# Patient Record
Sex: FEMALE | Race: WHITE | Employment: OTHER | URBAN - METROPOLITAN AREA
[De-identification: names, ages, dates, MRNs, and addresses within clinical notes are randomized per-mention and may not be internally consistent; named-entity substitution may affect disease eponyms.]

---

## 2017-02-28 RX ORDER — ROPINIROLE 0.5 MG/1
0.5 TABLET, FILM COATED ORAL 3 TIMES DAILY
Qty: 270 TABLET | Refills: 0 | Status: SHIPPED | OUTPATIENT
Start: 2017-02-28 | End: 2017-03-03 | Stop reason: SDUPTHER

## 2017-03-01 ENCOUNTER — HOSPITAL ENCOUNTER (OUTPATIENT)
Dept: OTHER | Age: 82
Discharge: HOME | End: 2017-03-31
Attending: INTERNAL MEDICINE | Admitting: INTERNAL MEDICINE

## 2017-03-03 ENCOUNTER — OFFICE VISIT (OUTPATIENT)
Dept: INTERNAL MEDICINE CLINIC | Age: 82
End: 2017-03-03

## 2017-03-03 VITALS
RESPIRATION RATE: 12 BRPM | SYSTOLIC BLOOD PRESSURE: 142 MMHG | OXYGEN SATURATION: 94 % | BODY MASS INDEX: 35.08 KG/M2 | HEIGHT: 63 IN | WEIGHT: 198 LBS | HEART RATE: 56 BPM | DIASTOLIC BLOOD PRESSURE: 76 MMHG

## 2017-03-03 DIAGNOSIS — E03.9 ACQUIRED HYPOTHYROIDISM: ICD-10-CM

## 2017-03-03 DIAGNOSIS — K21.9 GASTROESOPHAGEAL REFLUX DISEASE, ESOPHAGITIS PRESENCE NOT SPECIFIED: ICD-10-CM

## 2017-03-03 DIAGNOSIS — R73.01 IMPAIRED FASTING GLUCOSE: ICD-10-CM

## 2017-03-03 DIAGNOSIS — Z12.31 ENCOUNTER FOR SCREENING MAMMOGRAM FOR MALIGNANT NEOPLASM OF BREAST: ICD-10-CM

## 2017-03-03 DIAGNOSIS — E78.5 HYPERLIPIDEMIA, UNSPECIFIED HYPERLIPIDEMIA TYPE: ICD-10-CM

## 2017-03-03 DIAGNOSIS — N18.30 CKD (CHRONIC KIDNEY DISEASE), STAGE III (HCC): ICD-10-CM

## 2017-03-03 DIAGNOSIS — E66.09 NON MORBID OBESITY DUE TO EXCESS CALORIES: ICD-10-CM

## 2017-03-03 DIAGNOSIS — I10 ESSENTIAL HYPERTENSION: Primary | ICD-10-CM

## 2017-03-03 DIAGNOSIS — G25.81 RESTLESS LEG SYNDROME: ICD-10-CM

## 2017-03-03 DIAGNOSIS — Z12.11 COLON CANCER SCREENING: ICD-10-CM

## 2017-03-03 LAB — HBA1C MFR BLD: 5.7 %

## 2017-03-03 PROCEDURE — G8427 DOCREV CUR MEDS BY ELIG CLIN: HCPCS | Performed by: INTERNAL MEDICINE

## 2017-03-03 PROCEDURE — 1123F ACP DISCUSS/DSCN MKR DOCD: CPT | Performed by: INTERNAL MEDICINE

## 2017-03-03 PROCEDURE — 83036 HEMOGLOBIN GLYCOSYLATED A1C: CPT | Performed by: INTERNAL MEDICINE

## 2017-03-03 PROCEDURE — 82274 ASSAY TEST FOR BLOOD FECAL: CPT | Performed by: INTERNAL MEDICINE

## 2017-03-03 PROCEDURE — 4040F PNEUMOC VAC/ADMIN/RCVD: CPT | Performed by: INTERNAL MEDICINE

## 2017-03-03 PROCEDURE — G8417 CALC BMI ABV UP PARAM F/U: HCPCS | Performed by: INTERNAL MEDICINE

## 2017-03-03 PROCEDURE — G8484 FLU IMMUNIZE NO ADMIN: HCPCS | Performed by: INTERNAL MEDICINE

## 2017-03-03 PROCEDURE — 99203 OFFICE O/P NEW LOW 30 MIN: CPT | Performed by: INTERNAL MEDICINE

## 2017-03-03 PROCEDURE — 1036F TOBACCO NON-USER: CPT | Performed by: INTERNAL MEDICINE

## 2017-03-03 PROCEDURE — 1090F PRES/ABSN URINE INCON ASSESS: CPT | Performed by: INTERNAL MEDICINE

## 2017-03-03 PROCEDURE — G8400 PT W/DXA NO RESULTS DOC: HCPCS | Performed by: INTERNAL MEDICINE

## 2017-03-03 RX ORDER — FLUVASTATIN SODIUM 80 MG/1
80 TABLET, FILM COATED, EXTENDED RELEASE ORAL DAILY
Qty: 90 TABLET | Refills: 3 | Status: SHIPPED | OUTPATIENT
Start: 2017-03-03 | End: 2018-02-06 | Stop reason: SDUPTHER

## 2017-03-03 RX ORDER — CHLORAL HYDRATE 500 MG
1000 CAPSULE ORAL 2 TIMES DAILY
Qty: 180 CAPSULE | Refills: 3 | Status: SHIPPED | OUTPATIENT
Start: 2017-03-03 | End: 2018-09-12 | Stop reason: ALTCHOICE

## 2017-03-03 RX ORDER — LOSARTAN POTASSIUM 50 MG/1
TABLET ORAL
Qty: 90 TABLET | Refills: 3 | Status: SHIPPED | OUTPATIENT
Start: 2017-03-03 | End: 2017-04-18 | Stop reason: SDUPTHER

## 2017-03-03 RX ORDER — PANTOPRAZOLE SODIUM 40 MG/1
40 TABLET, DELAYED RELEASE ORAL DAILY
Qty: 90 TABLET | Refills: 3 | Status: SHIPPED | OUTPATIENT
Start: 2017-03-03 | End: 2017-07-06

## 2017-03-03 RX ORDER — ROPINIROLE 0.5 MG/1
0.5 TABLET, FILM COATED ORAL 3 TIMES DAILY
Qty: 270 TABLET | Refills: 3 | Status: SHIPPED | OUTPATIENT
Start: 2017-03-03 | End: 2017-08-03 | Stop reason: DRUGHIGH

## 2017-03-03 RX ORDER — LEVOTHYROXINE SODIUM 137 UG/1
150 TABLET ORAL DAILY
Qty: 90 TABLET | Refills: 1 | Status: SHIPPED | OUTPATIENT
Start: 2017-03-03 | End: 2017-03-10 | Stop reason: SDUPTHER

## 2017-03-03 RX ORDER — RANITIDINE 150 MG/1
150 TABLET ORAL 2 TIMES DAILY
Qty: 90 TABLET | Refills: 3 | Status: SHIPPED | OUTPATIENT
Start: 2017-03-03 | End: 2017-08-07 | Stop reason: SDUPTHER

## 2017-03-05 PROBLEM — E78.5 HYPERLIPIDEMIA: Status: ACTIVE | Noted: 2017-03-05

## 2017-03-05 PROBLEM — I10 ESSENTIAL HYPERTENSION: Status: ACTIVE | Noted: 2017-03-05

## 2017-03-05 PROBLEM — G25.81 RESTLESS LEG SYNDROME: Status: ACTIVE | Noted: 2017-03-05

## 2017-03-05 PROBLEM — R73.01 IMPAIRED FASTING GLUCOSE: Status: ACTIVE | Noted: 2017-03-05

## 2017-03-05 PROBLEM — E03.9 ACQUIRED HYPOTHYROIDISM: Status: ACTIVE | Noted: 2017-03-05

## 2017-03-05 PROBLEM — N18.30 CKD (CHRONIC KIDNEY DISEASE), STAGE III (HCC): Status: ACTIVE | Noted: 2017-03-05

## 2017-03-05 PROBLEM — K21.9 GASTROESOPHAGEAL REFLUX DISEASE: Status: ACTIVE | Noted: 2017-03-05

## 2017-03-09 LAB
ALBUMIN SERPL-MCNC: 3.9 GM/DL (ref 3.4–5)
ALP BLD-CCNC: 77 IU/L (ref 40–128)
ALT SERPL-CCNC: 9 U/L (ref 10–40)
ANION GAP SERPL CALCULATED.3IONS-SCNC: 9 MMOL/L (ref 4–16)
AST SERPL-CCNC: 14 IU/L (ref 15–37)
BASOPHILS ABSOLUTE: 0 K/CU MM
BASOPHILS RELATIVE PERCENT: 0.7 % (ref 0–1)
BILIRUB SERPL-MCNC: 0.3 MG/DL (ref 0–1)
BUN BLDV-MCNC: 19 MG/DL (ref 6–23)
CALCIUM SERPL-MCNC: 9.9 MG/DL (ref 8.3–10.6)
CHLORIDE BLD-SCNC: 106 MMOL/L (ref 99–110)
CHOLESTEROL: 166 MG/DL
CO2: 29 MMOL/L (ref 21–32)
CREAT SERPL-MCNC: 1.1 MG/DL (ref 0.6–1.1)
DIFFERENTIAL TYPE: ABNORMAL
EOSINOPHILS ABSOLUTE: 0.2 K/CU MM
EOSINOPHILS RELATIVE PERCENT: 3.5 % (ref 0–3)
GFR AFRICAN AMERICAN: 58 ML/MIN/1.73M2
GFR NON-AFRICAN AMERICAN: 48 ML/MIN/1.73M2
GLUCOSE BLD-MCNC: 95 MG/DL (ref 70–140)
HCT VFR BLD CALC: 38.5 % (ref 37–47)
HDLC SERPL-MCNC: 47 MG/DL
HEMOGLOBIN: 12.2 GM/DL (ref 12.5–16)
IMMATURE NEUTROPHIL %: 0.3 % (ref 0–0.43)
LDL CHOLESTEROL DIRECT: 115 MG/DL
LYMPHOCYTES ABSOLUTE: 2.6 K/CU MM
LYMPHOCYTES RELATIVE PERCENT: 44.6 % (ref 24–44)
MCH RBC QN AUTO: 26.7 PG (ref 27–31)
MCHC RBC AUTO-ENTMCNC: 31.7 % (ref 32–36)
MCV RBC AUTO: 84.2 FL (ref 78–100)
MONOCYTES ABSOLUTE: 0.5 K/CU MM
MONOCYTES RELATIVE PERCENT: 8.4 % (ref 0–4)
NUCLEATED RBC %: 0 %
PDW BLD-RTO: 13.5 % (ref 11.7–14.9)
PLATELET # BLD: 180 K/CU MM (ref 140–440)
PMV BLD AUTO: 11.9 FL (ref 7.5–11.1)
POTASSIUM SERPL-SCNC: 4.2 MMOL/L (ref 3.5–5.1)
RBC # BLD: 4.57 M/CU MM (ref 4.2–5.4)
SEGMENTED NEUTROPHILS ABSOLUTE COUNT: 2.4 K/CU MM
SEGMENTED NEUTROPHILS RELATIVE PERCENT: 42.5 % (ref 36–66)
SODIUM BLD-SCNC: 144 MMOL/L (ref 135–145)
T4 FREE: 1.78 NG/DL (ref 0.9–1.8)
TOTAL IMMATURE NEUTOROPHIL: 0.02 K/CU MM
TOTAL NUCLEATED RBC: 0 K/CU MM
TOTAL PROTEIN: 6 GM/DL (ref 6.4–8.2)
TRIGL SERPL-MCNC: 128 MG/DL
TSH HIGH SENSITIVITY: 0.15 UIU/ML (ref 0.27–4.2)
WBC # BLD: 5.7 K/CU MM (ref 4–10.5)

## 2017-03-10 ENCOUNTER — TELEPHONE (OUTPATIENT)
Dept: INTERNAL MEDICINE CLINIC | Age: 82
End: 2017-03-10

## 2017-03-10 DIAGNOSIS — E03.9 ACQUIRED HYPOTHYROIDISM: ICD-10-CM

## 2017-03-10 LAB
CONTROL: PRESENT
HEMOCCULT STL QL: NORMAL

## 2017-03-10 RX ORDER — LEVOTHYROXINE SODIUM 0.12 MG/1
125 TABLET ORAL DAILY
Qty: 90 TABLET | Refills: 1 | Status: SHIPPED | OUTPATIENT
Start: 2017-03-10 | End: 2017-08-07 | Stop reason: SDUPTHER

## 2017-03-20 ENCOUNTER — HOSPITAL ENCOUNTER (OUTPATIENT)
Dept: WOMENS IMAGING | Age: 82
Discharge: HOME | End: 2017-03-20
Attending: INTERNAL MEDICINE | Admitting: INTERNAL MEDICINE

## 2017-03-20 DIAGNOSIS — Z12.31 ENCOUNTER FOR SCREENING MAMMOGRAM FOR MALIGNANT NEOPLASM OF BREAST: ICD-10-CM

## 2017-03-23 ENCOUNTER — TELEPHONE (OUTPATIENT)
Dept: INTERNAL MEDICINE CLINIC | Age: 82
End: 2017-03-23

## 2017-03-23 DIAGNOSIS — R92.8 ABNORMAL MAMMOGRAM OF RIGHT BREAST: Primary | ICD-10-CM

## 2017-03-29 ENCOUNTER — HOSPITAL ENCOUNTER (OUTPATIENT)
Dept: ULTRASOUND IMAGING | Age: 82
Discharge: HOME | End: 2017-03-29
Attending: INTERNAL MEDICINE | Admitting: INTERNAL MEDICINE

## 2017-03-29 DIAGNOSIS — N64.89 BREAST ASYMMETRY: ICD-10-CM

## 2017-03-29 DIAGNOSIS — R92.8 ABNORMAL MAMMOGRAM: ICD-10-CM

## 2017-04-01 ENCOUNTER — HOSPITAL ENCOUNTER (OUTPATIENT)
Dept: OTHER | Age: 82
Discharge: HOME | End: 2017-04-30
Attending: INTERNAL MEDICINE | Admitting: INTERNAL MEDICINE

## 2017-04-04 ENCOUNTER — OFFICE VISIT (OUTPATIENT)
Dept: INTERNAL MEDICINE CLINIC | Age: 82
End: 2017-04-04

## 2017-04-04 VITALS
BODY MASS INDEX: 35.08 KG/M2 | DIASTOLIC BLOOD PRESSURE: 100 MMHG | SYSTOLIC BLOOD PRESSURE: 178 MMHG | OXYGEN SATURATION: 99 % | HEIGHT: 63 IN | RESPIRATION RATE: 12 BRPM | HEART RATE: 64 BPM | WEIGHT: 198 LBS

## 2017-04-04 DIAGNOSIS — R73.01 IMPAIRED FASTING GLUCOSE: ICD-10-CM

## 2017-04-04 DIAGNOSIS — E78.5 HYPERLIPIDEMIA, UNSPECIFIED HYPERLIPIDEMIA TYPE: ICD-10-CM

## 2017-04-04 DIAGNOSIS — K21.9 GASTROESOPHAGEAL REFLUX DISEASE, ESOPHAGITIS PRESENCE NOT SPECIFIED: ICD-10-CM

## 2017-04-04 DIAGNOSIS — I10 ESSENTIAL HYPERTENSION: Primary | ICD-10-CM

## 2017-04-04 DIAGNOSIS — E03.9 ACQUIRED HYPOTHYROIDISM: ICD-10-CM

## 2017-04-04 DIAGNOSIS — E66.09 NON MORBID OBESITY DUE TO EXCESS CALORIES: ICD-10-CM

## 2017-04-04 PROCEDURE — G8427 DOCREV CUR MEDS BY ELIG CLIN: HCPCS | Performed by: INTERNAL MEDICINE

## 2017-04-04 PROCEDURE — 1123F ACP DISCUSS/DSCN MKR DOCD: CPT | Performed by: INTERNAL MEDICINE

## 2017-04-04 PROCEDURE — 4040F PNEUMOC VAC/ADMIN/RCVD: CPT | Performed by: INTERNAL MEDICINE

## 2017-04-04 PROCEDURE — 1036F TOBACCO NON-USER: CPT | Performed by: INTERNAL MEDICINE

## 2017-04-04 PROCEDURE — G8417 CALC BMI ABV UP PARAM F/U: HCPCS | Performed by: INTERNAL MEDICINE

## 2017-04-04 PROCEDURE — 1090F PRES/ABSN URINE INCON ASSESS: CPT | Performed by: INTERNAL MEDICINE

## 2017-04-04 PROCEDURE — 99214 OFFICE O/P EST MOD 30 MIN: CPT | Performed by: INTERNAL MEDICINE

## 2017-04-04 PROCEDURE — G8400 PT W/DXA NO RESULTS DOC: HCPCS | Performed by: INTERNAL MEDICINE

## 2017-04-04 RX ORDER — AMLODIPINE BESYLATE 5 MG/1
5 TABLET ORAL DAILY
Qty: 30 TABLET | Refills: 3 | Status: SHIPPED | OUTPATIENT
Start: 2017-04-04 | End: 2017-08-03 | Stop reason: SDUPTHER

## 2017-04-04 RX ORDER — AMLODIPINE BESYLATE 5 MG/1
5 TABLET ORAL DAILY
Qty: 30 TABLET | Refills: 3 | Status: SHIPPED | OUTPATIENT
Start: 2017-04-04 | End: 2017-04-04 | Stop reason: SDUPTHER

## 2017-04-18 ENCOUNTER — OFFICE VISIT (OUTPATIENT)
Dept: INTERNAL MEDICINE CLINIC | Age: 82
End: 2017-04-18

## 2017-04-18 VITALS
WEIGHT: 194 LBS | RESPIRATION RATE: 12 BRPM | HEIGHT: 63 IN | BODY MASS INDEX: 34.38 KG/M2 | DIASTOLIC BLOOD PRESSURE: 79 MMHG | SYSTOLIC BLOOD PRESSURE: 158 MMHG | HEART RATE: 52 BPM | OXYGEN SATURATION: 98 %

## 2017-04-18 DIAGNOSIS — N18.30 CKD (CHRONIC KIDNEY DISEASE), STAGE III (HCC): ICD-10-CM

## 2017-04-18 DIAGNOSIS — I10 ESSENTIAL HYPERTENSION: Primary | ICD-10-CM

## 2017-04-18 PROCEDURE — 99213 OFFICE O/P EST LOW 20 MIN: CPT | Performed by: INTERNAL MEDICINE

## 2017-04-18 PROCEDURE — G8400 PT W/DXA NO RESULTS DOC: HCPCS | Performed by: INTERNAL MEDICINE

## 2017-04-18 PROCEDURE — G8427 DOCREV CUR MEDS BY ELIG CLIN: HCPCS | Performed by: INTERNAL MEDICINE

## 2017-04-18 PROCEDURE — 1123F ACP DISCUSS/DSCN MKR DOCD: CPT | Performed by: INTERNAL MEDICINE

## 2017-04-18 PROCEDURE — 1036F TOBACCO NON-USER: CPT | Performed by: INTERNAL MEDICINE

## 2017-04-18 PROCEDURE — G8417 CALC BMI ABV UP PARAM F/U: HCPCS | Performed by: INTERNAL MEDICINE

## 2017-04-18 PROCEDURE — 1090F PRES/ABSN URINE INCON ASSESS: CPT | Performed by: INTERNAL MEDICINE

## 2017-04-18 PROCEDURE — 4040F PNEUMOC VAC/ADMIN/RCVD: CPT | Performed by: INTERNAL MEDICINE

## 2017-04-18 RX ORDER — FUROSEMIDE 20 MG/1
20 TABLET ORAL EVERY OTHER DAY
Qty: 60 TABLET | Refills: 3 | Status: SHIPPED | OUTPATIENT
Start: 2017-04-18 | End: 2017-08-31 | Stop reason: SDUPTHER

## 2017-04-18 RX ORDER — LOSARTAN POTASSIUM 50 MG/1
50 TABLET ORAL 2 TIMES DAILY
Qty: 180 TABLET | Refills: 1 | Status: SHIPPED | OUTPATIENT
Start: 2017-04-18 | End: 2017-07-06

## 2017-04-18 ASSESSMENT — PATIENT HEALTH QUESTIONNAIRE - PHQ9
SUM OF ALL RESPONSES TO PHQ QUESTIONS 1-9: 0
2. FEELING DOWN, DEPRESSED OR HOPELESS: 0
SUM OF ALL RESPONSES TO PHQ9 QUESTIONS 1 & 2: 0
1. LITTLE INTEREST OR PLEASURE IN DOING THINGS: 0

## 2017-04-20 LAB
ANION GAP SERPL CALCULATED.3IONS-SCNC: 14 MMOL/L (ref 4–16)
BUN BLDV-MCNC: 16 MG/DL (ref 6–23)
CALCIUM SERPL-MCNC: 9.8 MG/DL (ref 8.3–10.6)
CHLORIDE BLD-SCNC: 108 MMOL/L (ref 99–110)
CO2: 23 MMOL/L (ref 21–32)
CREAT SERPL-MCNC: 1.2 MG/DL (ref 0.6–1.1)
GFR AFRICAN AMERICAN: 52 ML/MIN/1.73M2
GFR NON-AFRICAN AMERICAN: 43 ML/MIN/1.73M2
GLUCOSE BLD-MCNC: 92 MG/DL (ref 70–140)
MAGNESIUM: 2.3 MG/DL (ref 1.8–2.4)
POTASSIUM SERPL-SCNC: 4.3 MMOL/L (ref 3.5–5.1)
SODIUM BLD-SCNC: 145 MMOL/L (ref 135–145)

## 2017-04-22 ENCOUNTER — TELEPHONE (OUTPATIENT)
Dept: INTERNAL MEDICINE CLINIC | Age: 82
End: 2017-04-22

## 2017-04-22 DIAGNOSIS — N18.30 CKD (CHRONIC KIDNEY DISEASE), STAGE III (HCC): Primary | ICD-10-CM

## 2017-04-28 LAB
T4 FREE: 1.28 NG/DL (ref 0.9–1.8)
TSH HIGH SENSITIVITY: 1.8 UIU/ML (ref 0.27–4.2)

## 2017-05-01 ENCOUNTER — HOSPITAL ENCOUNTER (OUTPATIENT)
Dept: OTHER | Age: 82
Discharge: HOME | End: 2017-05-31
Attending: INTERNAL MEDICINE | Admitting: INTERNAL MEDICINE

## 2017-05-02 ENCOUNTER — OFFICE VISIT (OUTPATIENT)
Dept: INTERNAL MEDICINE CLINIC | Age: 82
End: 2017-05-02

## 2017-05-02 VITALS
HEIGHT: 63 IN | WEIGHT: 198 LBS | OXYGEN SATURATION: 95 % | HEART RATE: 63 BPM | SYSTOLIC BLOOD PRESSURE: 116 MMHG | DIASTOLIC BLOOD PRESSURE: 70 MMHG | BODY MASS INDEX: 35.08 KG/M2 | RESPIRATION RATE: 12 BRPM

## 2017-05-02 DIAGNOSIS — I10 ESSENTIAL HYPERTENSION: Primary | ICD-10-CM

## 2017-05-02 PROCEDURE — G8400 PT W/DXA NO RESULTS DOC: HCPCS | Performed by: INTERNAL MEDICINE

## 2017-05-02 PROCEDURE — 4040F PNEUMOC VAC/ADMIN/RCVD: CPT | Performed by: INTERNAL MEDICINE

## 2017-05-02 PROCEDURE — 99212 OFFICE O/P EST SF 10 MIN: CPT | Performed by: INTERNAL MEDICINE

## 2017-05-02 PROCEDURE — G8417 CALC BMI ABV UP PARAM F/U: HCPCS | Performed by: INTERNAL MEDICINE

## 2017-05-02 PROCEDURE — 1090F PRES/ABSN URINE INCON ASSESS: CPT | Performed by: INTERNAL MEDICINE

## 2017-05-02 PROCEDURE — 1036F TOBACCO NON-USER: CPT | Performed by: INTERNAL MEDICINE

## 2017-05-02 PROCEDURE — 1123F ACP DISCUSS/DSCN MKR DOCD: CPT | Performed by: INTERNAL MEDICINE

## 2017-05-02 PROCEDURE — G8427 DOCREV CUR MEDS BY ELIG CLIN: HCPCS | Performed by: INTERNAL MEDICINE

## 2017-05-16 LAB
ANION GAP SERPL CALCULATED.3IONS-SCNC: 14 MMOL/L (ref 4–16)
BUN BLDV-MCNC: 23 MG/DL (ref 6–23)
CALCIUM SERPL-MCNC: 9.8 MG/DL (ref 8.3–10.6)
CHLORIDE BLD-SCNC: 107 MMOL/L (ref 99–110)
CO2: 23 MMOL/L (ref 21–32)
CREAT SERPL-MCNC: 1.3 MG/DL (ref 0.6–1.1)
GFR AFRICAN AMERICAN: 48 ML/MIN/1.73M2
GFR NON-AFRICAN AMERICAN: 39 ML/MIN/1.73M2
GLUCOSE BLD-MCNC: 112 MG/DL (ref 70–140)
POTASSIUM SERPL-SCNC: 3.9 MMOL/L (ref 3.5–5.1)
SODIUM BLD-SCNC: 144 MMOL/L (ref 135–145)

## 2017-05-24 ENCOUNTER — TELEPHONE (OUTPATIENT)
Dept: INTERNAL MEDICINE CLINIC | Age: 82
End: 2017-05-24

## 2017-05-24 DIAGNOSIS — N18.30 CKD (CHRONIC KIDNEY DISEASE), STAGE III (HCC): Primary | ICD-10-CM

## 2017-06-01 ENCOUNTER — HOSPITAL ENCOUNTER (OUTPATIENT)
Dept: OTHER | Age: 82
Discharge: HOME | End: 2017-06-30
Attending: INTERNAL MEDICINE | Admitting: INTERNAL MEDICINE

## 2017-06-06 ENCOUNTER — TELEPHONE (OUTPATIENT)
Dept: INTERNAL MEDICINE CLINIC | Age: 82
End: 2017-06-06

## 2017-07-06 PROBLEM — E03.9 HYPOTHYROIDISM: Status: ACTIVE | Noted: 2017-07-06

## 2017-07-06 PROBLEM — I10 HTN (HYPERTENSION): Status: ACTIVE | Noted: 2017-07-06

## 2017-07-06 PROBLEM — E78.5 HYPERLIPIDEMIA: Status: ACTIVE | Noted: 2017-07-06

## 2017-07-06 PROBLEM — N18.31 CHRONIC KIDNEY DISEASE (CKD) STAGE G3A/A1, MODERATELY DECREASED GLOMERULAR FILTRATION RATE (GFR) BETWEEN 45-59 ML/MIN/1.73 SQUARE METER AND ALBUMINURIA CREATININE RATIO LESS THAN 30 MG/G (HCC): Status: ACTIVE | Noted: 2017-07-06

## 2017-07-06 PROBLEM — E11.9 DM (DIABETES MELLITUS) (HCC): Status: ACTIVE | Noted: 2017-07-06

## 2017-08-01 ENCOUNTER — HOSPITAL ENCOUNTER (OUTPATIENT)
Dept: OTHER | Age: 82
Discharge: HOME | End: 2017-08-31
Attending: INTERNAL MEDICINE | Admitting: INTERNAL MEDICINE

## 2017-08-03 ENCOUNTER — OFFICE VISIT (OUTPATIENT)
Dept: INTERNAL MEDICINE CLINIC | Age: 82
End: 2017-08-03

## 2017-08-03 VITALS
OXYGEN SATURATION: 95 % | RESPIRATION RATE: 14 BRPM | BODY MASS INDEX: 35.97 KG/M2 | WEIGHT: 203 LBS | HEART RATE: 82 BPM | HEIGHT: 63 IN | SYSTOLIC BLOOD PRESSURE: 140 MMHG | DIASTOLIC BLOOD PRESSURE: 80 MMHG

## 2017-08-03 DIAGNOSIS — I10 ESSENTIAL HYPERTENSION: ICD-10-CM

## 2017-08-03 DIAGNOSIS — N18.30 CKD (CHRONIC KIDNEY DISEASE), STAGE III (HCC): ICD-10-CM

## 2017-08-03 DIAGNOSIS — K21.9 GASTROESOPHAGEAL REFLUX DISEASE, ESOPHAGITIS PRESENCE NOT SPECIFIED: ICD-10-CM

## 2017-08-03 DIAGNOSIS — E03.9 ACQUIRED HYPOTHYROIDISM: ICD-10-CM

## 2017-08-03 DIAGNOSIS — E78.5 HYPERLIPIDEMIA, UNSPECIFIED HYPERLIPIDEMIA TYPE: ICD-10-CM

## 2017-08-03 DIAGNOSIS — G25.81 RESTLESS LEG SYNDROME: ICD-10-CM

## 2017-08-03 DIAGNOSIS — I10 ESSENTIAL HYPERTENSION: Primary | ICD-10-CM

## 2017-08-03 PROCEDURE — 1090F PRES/ABSN URINE INCON ASSESS: CPT | Performed by: INTERNAL MEDICINE

## 2017-08-03 PROCEDURE — 1123F ACP DISCUSS/DSCN MKR DOCD: CPT | Performed by: INTERNAL MEDICINE

## 2017-08-03 PROCEDURE — G8417 CALC BMI ABV UP PARAM F/U: HCPCS | Performed by: INTERNAL MEDICINE

## 2017-08-03 PROCEDURE — G8400 PT W/DXA NO RESULTS DOC: HCPCS | Performed by: INTERNAL MEDICINE

## 2017-08-03 PROCEDURE — 4040F PNEUMOC VAC/ADMIN/RCVD: CPT | Performed by: INTERNAL MEDICINE

## 2017-08-03 PROCEDURE — G8427 DOCREV CUR MEDS BY ELIG CLIN: HCPCS | Performed by: INTERNAL MEDICINE

## 2017-08-03 PROCEDURE — 99214 OFFICE O/P EST MOD 30 MIN: CPT | Performed by: INTERNAL MEDICINE

## 2017-08-03 PROCEDURE — 1036F TOBACCO NON-USER: CPT | Performed by: INTERNAL MEDICINE

## 2017-08-03 RX ORDER — ROPINIROLE 0.5 MG/1
1 TABLET, FILM COATED ORAL NIGHTLY
Qty: 60 TABLET | Refills: 3 | Status: SHIPPED | OUTPATIENT
Start: 2017-08-03 | End: 2018-02-06 | Stop reason: SDUPTHER

## 2017-08-05 RX ORDER — AMLODIPINE BESYLATE 5 MG/1
TABLET ORAL
Qty: 30 TABLET | Refills: 3 | Status: SHIPPED | OUTPATIENT
Start: 2017-08-05 | End: 2017-09-01 | Stop reason: SDUPTHER

## 2017-08-22 LAB
ANION GAP SERPL CALCULATED.3IONS-SCNC: 14 MMOL/L (ref 4–16)
BACTERIA: ABNORMAL /HPF
BILIRUBIN URINE: NEGATIVE MG/DL
BLOOD, URINE: NEGATIVE
BUN BLDV-MCNC: 24 MG/DL (ref 6–23)
CALCIUM SERPL-MCNC: 9.8 MG/DL (ref 8.3–10.6)
CHLORIDE BLD-SCNC: 102 MMOL/L (ref 99–110)
CLARITY: CLEAR
CO2: 26 MMOL/L (ref 21–32)
COLOR: COLORLESS
CREAT SERPL-MCNC: 1.3 MG/DL (ref 0.6–1.1)
CREATININE URINE: 17.2 MG/DL (ref 28–217)
GFR AFRICAN AMERICAN: 48 ML/MIN/1.73M2
GFR NON-AFRICAN AMERICAN: 39 ML/MIN/1.73M2
GLUCOSE BLD-MCNC: 105 MG/DL (ref 70–140)
GLUCOSE, URINE: NEGATIVE MG/DL
KETONES, URINE: NEGATIVE MG/DL
LEUKOCYTE ESTERASE, URINE: NEGATIVE
MAGNESIUM: 2 MG/DL (ref 1.8–2.4)
MUCUS: ABNORMAL HPF
NITRITE URINE, QUANTITATIVE: NEGATIVE
PH, URINE: 5 (ref 5–8)
PHOSPHORUS: 3.8 MG/DL (ref 2.5–4.9)
POTASSIUM SERPL-SCNC: 4 MMOL/L (ref 3.5–5.1)
PROT/CREAT RATIO, UR: 0.2
PROTEIN UA: NEGATIVE MG/DL
RBC URINE: 1 /HPF (ref 0–6)
SODIUM BLD-SCNC: 142 MMOL/L (ref 135–145)
SPECIFIC GRAVITY UA: 1.01 (ref 1–1.03)
TRICHOMONAS: ABNORMAL /HPF
URINE TOTAL PROTEIN: 4 MG/DL
UROBILINOGEN, URINE: NORMAL MG/DL (ref 0.2–1)
WBC UA: 1 /HPF (ref 0–5)

## 2017-09-01 ENCOUNTER — HOSPITAL ENCOUNTER (OUTPATIENT)
Dept: OTHER | Age: 82
Discharge: HOME | End: 2017-09-30
Attending: INTERNAL MEDICINE | Admitting: INTERNAL MEDICINE

## 2017-10-05 ENCOUNTER — OFFICE VISIT (OUTPATIENT)
Dept: CARDIOLOGY CLINIC | Age: 82
End: 2017-10-05

## 2017-10-05 ENCOUNTER — NURSE ONLY (OUTPATIENT)
Dept: CARDIOLOGY CLINIC | Age: 82
End: 2017-10-05

## 2017-10-05 VITALS
DIASTOLIC BLOOD PRESSURE: 76 MMHG | SYSTOLIC BLOOD PRESSURE: 136 MMHG | WEIGHT: 205.6 LBS | HEART RATE: 61 BPM | HEIGHT: 62 IN | BODY MASS INDEX: 37.84 KG/M2

## 2017-10-05 DIAGNOSIS — N18.31 CHRONIC KIDNEY DISEASE (CKD) STAGE G3A/A1, MODERATELY DECREASED GLOMERULAR FILTRATION RATE (GFR) BETWEEN 45-59 ML/MIN/1.73 SQUARE METER AND ALBUMINURIA CREATININE RATIO LESS THAN 30 MG/G (HCC): ICD-10-CM

## 2017-10-05 DIAGNOSIS — R42 DIZZY: ICD-10-CM

## 2017-10-05 DIAGNOSIS — E08.22 DIABETES MELLITUS DUE TO UNDERLYING CONDITION WITH STAGE 3 CHRONIC KIDNEY DISEASE, WITHOUT LONG-TERM CURRENT USE OF INSULIN (HCC): ICD-10-CM

## 2017-10-05 DIAGNOSIS — R94.31 ABNORMAL ECG: ICD-10-CM

## 2017-10-05 DIAGNOSIS — I10 ESSENTIAL HYPERTENSION: ICD-10-CM

## 2017-10-05 DIAGNOSIS — R07.89 CHEST HEAVINESS: ICD-10-CM

## 2017-10-05 DIAGNOSIS — N18.30 DIABETES MELLITUS DUE TO UNDERLYING CONDITION WITH STAGE 3 CHRONIC KIDNEY DISEASE, WITHOUT LONG-TERM CURRENT USE OF INSULIN (HCC): ICD-10-CM

## 2017-10-05 DIAGNOSIS — E78.5 HYPERLIPIDEMIA, UNSPECIFIED HYPERLIPIDEMIA TYPE: ICD-10-CM

## 2017-10-05 DIAGNOSIS — R00.2 PALPITATIONS: Primary | ICD-10-CM

## 2017-10-05 DIAGNOSIS — R00.2 PALPITATIONS: ICD-10-CM

## 2017-10-05 DIAGNOSIS — R00.2 HEART PALPITATIONS: ICD-10-CM

## 2017-10-05 DIAGNOSIS — I38 VHD (VALVULAR HEART DISEASE): ICD-10-CM

## 2017-10-05 DIAGNOSIS — R06.02 SOB (SHORTNESS OF BREATH): Primary | ICD-10-CM

## 2017-10-05 PROCEDURE — 4040F PNEUMOC VAC/ADMIN/RCVD: CPT | Performed by: INTERNAL MEDICINE

## 2017-10-05 PROCEDURE — G8484 FLU IMMUNIZE NO ADMIN: HCPCS | Performed by: INTERNAL MEDICINE

## 2017-10-05 PROCEDURE — 99204 OFFICE O/P NEW MOD 45 MIN: CPT | Performed by: INTERNAL MEDICINE

## 2017-10-05 PROCEDURE — 1036F TOBACCO NON-USER: CPT | Performed by: INTERNAL MEDICINE

## 2017-10-05 PROCEDURE — G8417 CALC BMI ABV UP PARAM F/U: HCPCS | Performed by: INTERNAL MEDICINE

## 2017-10-05 PROCEDURE — 1090F PRES/ABSN URINE INCON ASSESS: CPT | Performed by: INTERNAL MEDICINE

## 2017-10-05 PROCEDURE — 93000 ELECTROCARDIOGRAM COMPLETE: CPT | Performed by: INTERNAL MEDICINE

## 2017-10-05 PROCEDURE — G8427 DOCREV CUR MEDS BY ELIG CLIN: HCPCS | Performed by: INTERNAL MEDICINE

## 2017-10-05 RX ORDER — DOCUSATE SODIUM 100 MG/1
100 CAPSULE, LIQUID FILLED ORAL NIGHTLY
COMMUNITY
End: 2018-09-06

## 2017-10-05 NOTE — MR AVS SNAPSHOT
After Visit Summary             [facility]   10/5/2017 9:40 AM   Office Visit    Description:  Female : 1935   Provider:  Stephanie Breaux MD   Department:  Cardiology [address] and Future Appointments         Below is a list of your follow-up and future appointments. This may not be a complete list as you may have made appointments directly with providers that we are not aware of or your providers may have made some for you. Please call your providers to confirm appointments. It is important to keep your appointments. Please bring your current insurance card, photo ID, co-pay, and all medication bottles to your appointment. If self-pay, payment is expected at the time of service. Your To-Do List     Future Appointments Provider Department Dept Phone    10/12/2017 8:45 AM SCHEDULE, [facility] ECHO [facility] [address] [phone]    An echocardiogram is a test that uses sound waves to create a moving picture of the heart. The picture is much more detailed than a plain x-ray [email or web address]  image and involves no radiation exposure. 10/12/2017 9:30 AM [facility] [phone]    2017 3:30 PM Melva Chaudhari MD [address] [place] [address] [phone]    2017 10:00 AM Stephanie Breaux MD Cardiology [place] [address] [phone]    Please arrive 15 minutes prior to appointment, bring photo ID and insurance card. 2017 11:30 AM Albina Mantilla MD [facility]  [phone]    Please arrive 15 minutes prior to appointment time, bring insurance card and photo ID.      Future Orders Complete By Expires    ECHO Complete 2D W Doppler W Color [85974 Custom]  10/5/2017 10/5/2018    NM Myocardial Spect Rest Exercise or Rx [15789 Custom]  10/5/2017 10/5/2018    Comments:    Stefanie Cuadra Standing Orders Interval Expires    EKG 12 Lead [EKG1 Custom]  Every 6 Months until 10/5/2018 10/5/2018    Follow-Up    Return in about 6 weeks (around 11/16/2017). Information from Your Visit        Department     Name Address Phone Fax    Cardiology Jennifer Martinez [address] [address] [phone]      You Were Seen for:         Comments    SOB (shortness of breath)   [841348]         Vital Signs     Blood Pressure Pulse Height Weight Body Mass Index Smoking Status    136/76 61 5' 2\" (1.575 m) 205 lb 9.6 oz (93.3 kg) 37.6 kg/m2 Never Smoker      Additional Information about your Body Mass Index (BMI)           Your BMI as listed above is considered obese (30 or more). BMI is an estimate of body fat, calculated from your height and weight. The higher your BMI, the greater your risk of heart disease, high blood pressure, type 2 diabetes, stroke, gallstones, arthritis, sleep apnea, and certain cancers. BMI is not perfect. It may overestimate body fat in athletes and people who are more muscular. Even a small weight loss (between 5 and 10 percent of your current weight) by decreasing your calorie intake and becoming more physically active will help lower your risk of developing or worsening diseases associated with obesity. Learn more at: [email or web address]          Instructions    We will evaluate her symptoms further with the 30 day even monitor and a repeat echo and a stress nuclear study. Continue current cardiovascular medications which have been reviewed and discussed individually with you. Primary prevention is the goal by aggressive risk modification, healthy and therapeutic life style changes for cardiovascular risk reduction. Various goals are discussed and questions answered. Follow up in office in 6 week,. Multiple questions answered. Patient verbalizes understanding and asks relevant questions. ECG on 10/5/2017  9:59 AM : ECG Report                     Additional Information        Basic Information     Date Of Birth Sex Race Ethnicity Preferred Language    1935 Female White Non-/Non  English      Problem List as of 10/5/2017  Date Reviewed: 10/5/2017                Heart palpitations    Chest heaviness    Abnormal ECG    VHD (valvular heart disease)    Chronic kidney disease (CKD) stage G3a/A1, moderately decreased glomerular filtration rate (GFR) between 45-59 mL/min/1.73 square meter and albuminuria creatinine ratio less than 30 mg/g    Hyperlipidemia    DM (diabetes mellitus) ([facility] [address]    Impaired fasting glucose    Gastroesophageal reflux disease    Acquired hypothyroidism    Essential hypertension    Restless leg syndrome      Immunizations as of 10/5/2017     Name Date    Influenza, High Dose 9/9/2016    Zoster 9/12/2014      Preventive Care        Date Due    Tetanus Combination Vaccine (1 - Tdap) 8/3/1955    Pneumococcal Vaccines (two) for all adults aged 72 and over (1 of 2 - PCV13) 8/3/2001    Yearly Flu Vaccine (1) 9/1/2017            MyChart Signup           Our records indicate that you have declined MyChart signup.

## 2017-10-05 NOTE — ASSESSMENT & PLAN NOTE
We will repeat echo for follow-up on valvular regurgitation. Clinically she does not have any significant murmurs. Patient was not aware of valvular regurgitation on her previous echo and questions are answered.

## 2017-10-05 NOTE — ASSESSMENT & PLAN NOTE
Continue current dose of statins for now. We may adjust the medications on her follow-up visit for LDL goal of less than 100.

## 2017-10-05 NOTE — PROGRESS NOTES
Mihir Richard  1935      Dear Dr. 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 She also feels short of breath when she is having palpitations and feel diaphoretic. She denies nausea or vomiting. She is also complaining of increasing excessive fatigue. She has been on Lopressor for palpitations which was taken off an month ago for symptoms of fatigue. Her fatigue did not improve how what her palpitations got worse. She had to go to emergency room with palpitations and reported that her blood pressure monitor was reading a heart rate of 160-170 when she came off of Lopressor. I have revealed emergency room visit notes and her pressure was high normal and her heart rate was 122 bpm on the EKG. She had had noninvasive evaluation at [facility] cardiology in the past and I have reviewed the echo from last year and follow-up note from August 1, 2017. Harles Old She denies syncope or near syncope. She denies any significant leg swelling, orthopnea or paroxysmal nocturnal dyspnea. She monitors her blood pressure at home and I have reviewed those readings. Her blood pressure systolic ranges from 7859 bpm.  She feels worse when it is less than 100. This is when she also felt palpitations and also felt diaphoretic. However her heart rate does not go up much with this wide fluctuations of systolic pressures. She has a new blood pressure monitor which seems to be fairly reliable and is consistent.     REVIEW OF SYSTEMS:   Constitutional:  she has generalized weakness for the last 6 months  Eyes:  Denies change in visual acuity  HENT:  Denies nasal congestion or sore throat  Respiratory:  Denies cough or shortness of breath  Cardiovascular: as in HPI  GI: (1.575 m). Weight as of this encounter: 205 lb 9.6 oz (93.3 kg). Wt Readings from Last 3 Encounters:   10/05/17 205 lb 9.6 oz (93.3 kg)   08/31/17 204 lb 3.2 oz (92.6 kg)   08/03/17 203 lb (92.1 kg)     General Appearance: Moderately obese pleasant female awake alert oriented ×3 in no apparent distress    HEENT: Pupils are equal.    NECK: No JVP or carotid bruits    CHEST:  There is no chest wall tenderness. Cardiovascular: Auscultation: Normal S1 and S2 . No audible murmurs noted. Respiratory:  Breath sounds are Normal    Extremities:  No edema, clubbing or cyanosis    SKIN: Warm and well perfused, no pallor or cyanosis    Vascular exam:  Abdominal Aorta: Normal; Femoral Arteries: 2+ and equal; Pedal Pulses: 2+ and equal     Abdomen:  No masses or tenderness. No organomegaly noted. Neurologic:  Oriented to time, place, and person and non-anxious. No focal neurological deficit noted. Psychiatric:  Judgment/Insight and Mood     LAB REVIEW:  CBC:   Lab Results   Component Value Date    WBC 6.9 07/18/2017    HGB 14.1 07/18/2017    HCT 41.9 07/18/2017     07/18/2017     Lipids:   Lab Results   Component Value Date    CHOL 166 03/09/2017    TRIG 128 03/09/2017    HDL 47 03/09/2017    LDLCALC 62 09/15/2016    LDLDIRECT 115 (H) 03/09/2017     Renal:   Lab Results   Component Value Date    BUN 24 08/22/2017    CREATININE 1.3 08/22/2017     08/22/2017    K 4.0 08/22/2017     EKG:  Normal sinus rhythm 61 bpm.  Left axis deviation. T-wave inversions noted in lead 1 and aVL and lead only 5 and V6. Compared to the EKG from July 2017 T-wave inversions in lateral leads are new and the rate is slower. ECHO:  From [facility] cardiology dated  October 6, 2016 reported mild to moderate mitral and tricuspid regurgitation, mild concentric left ventricle hypertrophy with ejection fraction of 54% and mild left atrial medication.     Assessment / Recommendations:    VHD (valvular heart disease)  We will repeat echo for follow-up on valvular regurgitation. Clinically she does not have any significant murmurs. Patient was not aware of valvular regurgitation on her previous echo and questions are answered. Hyperlipidemia  Continue current dose of statins for now. We may adjust the medications on her follow-up visit for LDL goal of less than 100. Heart palpitations  Obtain a 30 day even monitor. She reports blood pressure fluctuations with palpitations and suggest possible paroxysmal atrial fibrillation. Essential hypertension  Has been well controlled on current medications will continue the same for now. DM (diabetes mellitus) ([facility] [address]  Well-controlled according to the patient. Chronic kidney disease (CKD) stage G3a/A1, moderately decreased glomerular filtration rate (GFR) between 45-59 mL/min/1.73 square meter and albuminuria creatinine ratio less than 30 mg/g  Follows up with nephrology. We will evaluate her symptoms further with the 30 day even monitor and a repeat echo and a stress nuclear study. Continue current cardiovascular medications which have been reviewed and discussed individually with you. Primary prevention is the goal by aggressive risk modification, healthy and therapeutic life style changes for cardiovascular risk reduction. Various goals are discussed and questions answered. Follow up in office in 6 week,. Multiple questions answered. Patient verbalizes understanding and asks relevant questions.         Odilia Stein MD, 10/5/2017 10:55 AM

## 2017-10-05 NOTE — PATIENT INSTRUCTIONS
We will evaluate her symptoms further with the 30 day even monitor and a repeat echo and a stress nuclear study. Continue current cardiovascular medications which have been reviewed and discussed individually with you. Primary prevention is the goal by aggressive risk modification, healthy and therapeutic life style changes for cardiovascular risk reduction. Various goals are discussed and questions answered. Follow up in office in 6 week,. Multiple questions answered. Patient verbalizes understanding and asks relevant questions.

## 2017-10-05 NOTE — PROGRESS NOTES
30 days e-cardio monitor placed.  # [id number]. Instructed patient on how to record the event and to call monitoring center at [phone] if any problems arise. Instructed patient to disconnect the lead wires from the electrodes before bathing or showering and reattach them afterwards. Instructed patient that the electrodes should be changed every 3 days or if they no longer adhere to the skin. Patient to mail package after the monitor has ended. Patient verbalized understanding.

## 2017-10-12 ENCOUNTER — PROCEDURE VISIT (OUTPATIENT)
Dept: CARDIOLOGY CLINIC | Age: 82
End: 2017-10-12

## 2017-10-12 DIAGNOSIS — I10 ESSENTIAL HYPERTENSION: ICD-10-CM

## 2017-10-12 DIAGNOSIS — R07.89 CHEST HEAVINESS: ICD-10-CM

## 2017-10-12 DIAGNOSIS — E78.5 HYPERLIPIDEMIA, UNSPECIFIED HYPERLIPIDEMIA TYPE: ICD-10-CM

## 2017-10-12 DIAGNOSIS — N18.30 DIABETES MELLITUS DUE TO UNDERLYING CONDITION WITH STAGE 3 CHRONIC KIDNEY DISEASE, WITHOUT LONG-TERM CURRENT USE OF INSULIN (HCC): ICD-10-CM

## 2017-10-12 DIAGNOSIS — R00.2 PALPITATIONS: Primary | ICD-10-CM

## 2017-10-12 DIAGNOSIS — R94.31 ABNORMAL ECG: ICD-10-CM

## 2017-10-12 DIAGNOSIS — E08.22 DIABETES MELLITUS DUE TO UNDERLYING CONDITION WITH STAGE 3 CHRONIC KIDNEY DISEASE, WITHOUT LONG-TERM CURRENT USE OF INSULIN (HCC): ICD-10-CM

## 2017-10-12 LAB
LV EF: 55 %
LV EF: 69 %
LVEF MODALITY: NORMAL
LVEF MODALITY: NORMAL

## 2017-10-12 PROCEDURE — 93018 CV STRESS TEST I&R ONLY: CPT | Performed by: INTERNAL MEDICINE

## 2017-10-12 PROCEDURE — A9500 TC99M SESTAMIBI: HCPCS | Performed by: INTERNAL MEDICINE

## 2017-10-12 PROCEDURE — 93306 TTE W/DOPPLER COMPLETE: CPT | Performed by: INTERNAL MEDICINE

## 2017-10-12 PROCEDURE — 78452 HT MUSCLE IMAGE SPECT MULT: CPT | Performed by: INTERNAL MEDICINE

## 2017-10-12 PROCEDURE — 93017 CV STRESS TEST TRACING ONLY: CPT | Performed by: INTERNAL MEDICINE

## 2017-10-12 PROCEDURE — 93016 CV STRESS TEST SUPVJ ONLY: CPT | Performed by: INTERNAL MEDICINE

## 2017-10-13 ENCOUNTER — TELEPHONE (OUTPATIENT)
Dept: CARDIOLOGY CLINIC | Age: 82
End: 2017-10-13

## 2017-10-16 ENCOUNTER — TELEPHONE (OUTPATIENT)
Dept: CARDIOLOGY CLINIC | Age: 82
End: 2017-10-16

## 2017-10-16 NOTE — TELEPHONE ENCOUNTER
Left ventricular systolic function is normal with an ejection fraction of   50-60%.  Mild concentric left ventricular hypertrophy with Grade I diastolic   dysfunction.   Increased LVOT and AoV velocities with increased mean gradient of 13 mmHg.   Aortic valve is opening normally without signs of sclerosis.   No evidence of pericardial effusion. Supervising physician Dr. Pat Vicente .    Left ventricular perfusion is abnormal suggestive of small lateral wall    infarction without ischemia vs. artifact.    Normal Left ventricular size, wall motion and systolic function. Ejection    fraction is 69 %.        Recommendation    Continue aggressive lifestyle and risk modification and medical therapy.        Signatures     Advised pt of stress test and echo results.

## 2017-10-24 DIAGNOSIS — I10 ESSENTIAL HYPERTENSION: ICD-10-CM

## 2017-10-24 RX ORDER — AMLODIPINE BESYLATE 5 MG/1
TABLET ORAL
Qty: 90 TABLET | Refills: 3 | Status: SHIPPED | OUTPATIENT
Start: 2017-10-24 | End: 2018-02-06 | Stop reason: SDUPTHER

## 2017-10-24 RX ORDER — LOSARTAN POTASSIUM 50 MG/1
100 TABLET ORAL DAILY
Qty: 90 TABLET | Refills: 3 | Status: SHIPPED | OUTPATIENT
Start: 2017-10-24 | End: 2018-02-06 | Stop reason: SDUPTHER

## 2017-11-07 PROCEDURE — 93268 ECG RECORD/REVIEW: CPT | Performed by: INTERNAL MEDICINE

## 2017-11-16 ENCOUNTER — OFFICE VISIT (OUTPATIENT)
Dept: CARDIOLOGY CLINIC | Age: 82
End: 2017-11-16

## 2017-11-16 VITALS
WEIGHT: 206.2 LBS | HEIGHT: 62 IN | DIASTOLIC BLOOD PRESSURE: 70 MMHG | BODY MASS INDEX: 37.94 KG/M2 | HEART RATE: 68 BPM | SYSTOLIC BLOOD PRESSURE: 128 MMHG

## 2017-11-16 DIAGNOSIS — I10 ESSENTIAL HYPERTENSION: ICD-10-CM

## 2017-11-16 DIAGNOSIS — N18.31 CHRONIC KIDNEY DISEASE (CKD) STAGE G3A/A1, MODERATELY DECREASED GLOMERULAR FILTRATION RATE (GFR) BETWEEN 45-59 ML/MIN/1.73 SQUARE METER AND ALBUMINURIA CREATININE RATIO LESS THAN 30 MG/G (HCC): ICD-10-CM

## 2017-11-16 DIAGNOSIS — N18.30 DIABETES MELLITUS DUE TO UNDERLYING CONDITION WITH STAGE 3 CHRONIC KIDNEY DISEASE, WITHOUT LONG-TERM CURRENT USE OF INSULIN (HCC): ICD-10-CM

## 2017-11-16 DIAGNOSIS — E08.22 DIABETES MELLITUS DUE TO UNDERLYING CONDITION WITH STAGE 3 CHRONIC KIDNEY DISEASE, WITHOUT LONG-TERM CURRENT USE OF INSULIN (HCC): ICD-10-CM

## 2017-11-16 DIAGNOSIS — E78.5 HYPERLIPIDEMIA, UNSPECIFIED HYPERLIPIDEMIA TYPE: ICD-10-CM

## 2017-11-16 DIAGNOSIS — I38 VHD (VALVULAR HEART DISEASE): Primary | ICD-10-CM

## 2017-11-16 DIAGNOSIS — R07.89 CHEST HEAVINESS: ICD-10-CM

## 2017-11-16 PROCEDURE — 99214 OFFICE O/P EST MOD 30 MIN: CPT | Performed by: INTERNAL MEDICINE

## 2017-11-16 PROCEDURE — G8428 CUR MEDS NOT DOCUMENT: HCPCS | Performed by: INTERNAL MEDICINE

## 2017-11-16 PROCEDURE — G8400 PT W/DXA NO RESULTS DOC: HCPCS | Performed by: INTERNAL MEDICINE

## 2017-11-16 PROCEDURE — 1090F PRES/ABSN URINE INCON ASSESS: CPT | Performed by: INTERNAL MEDICINE

## 2017-11-16 PROCEDURE — G8484 FLU IMMUNIZE NO ADMIN: HCPCS | Performed by: INTERNAL MEDICINE

## 2017-11-16 PROCEDURE — 1123F ACP DISCUSS/DSCN MKR DOCD: CPT | Performed by: INTERNAL MEDICINE

## 2017-11-16 PROCEDURE — 1036F TOBACCO NON-USER: CPT | Performed by: INTERNAL MEDICINE

## 2017-11-16 PROCEDURE — G8417 CALC BMI ABV UP PARAM F/U: HCPCS | Performed by: INTERNAL MEDICINE

## 2017-11-16 PROCEDURE — 4040F PNEUMOC VAC/ADMIN/RCVD: CPT | Performed by: INTERNAL MEDICINE

## 2017-11-16 NOTE — ASSESSMENT & PLAN NOTE
Patient reports her symptoms have resolved and she is not having it on a day-to-day basis. We will consider cardiac catheterization if she is having recurrent chest heaviness until then aggressive risk modification is counseled. In view of her renal insufficiency she is considered higher than normal risk for cardiac catheterization and interventions.

## 2017-11-16 NOTE — PATIENT INSTRUCTIONS
If you have any questions, please call our office at [phone]  Continue current cardiovascular medications which have been reviewed and discussed individually with you. Primary prevention is the goal by aggressive risk modification, healthy and therapeutic life style changes for cardiovascular risk reduction. Various goals are discussed and questions answered. Appropriate prescriptions if needed on this visit are addressed. After visit summery is provided. Questions answered and patient verbalizes understanding. Follow up in office in 12 months, sooner if needed.

## 2017-11-16 NOTE — ASSESSMENT & PLAN NOTE
Continue aggressive statin therapy to aim for LDL of less than 70. She is to have follow-up lipids done by family physician.

## 2017-11-16 NOTE — PROGRESS NOTES
Phenobarbital; Vancomycin; Zocor [simvastatin]; and Sulfa antibiotics  Prior to Admission medications    Medication Sig Start Date End Date Taking?  Authorizing Provider   metFORMIN (GLUCOPHAGE) 1000 MG tablet Take 1,000 mg by mouth nightly   Yes Historical Provider, MD   losartan (COZAAR) 50 MG tablet Take 2 tablets by mouth daily 10/24/17  Yes Dorian Grover MD   amLODIPine (NORVASC) 5 MG tablet TAKE 1 TABLET BY MOUTH DAILY 10/24/17  Yes Dorian Grover MD   docusate sodium (COLACE) 100 MG capsule Take 100 mg by mouth 2 times daily   Yes Historical Provider, MD   furosemide (LASIX) 20 MG tablet Take 1 tablet by mouth every other day 8/31/17  Yes Lele Frazier MD   ranitidine (ZANTAC) 150 MG tablet TAKE 1 TABLET TWICE DAILY 8/9/17  Yes Dorian Grover MD   levothyroxine (SYNTHROID) 125 MCG tablet TAKE 1 TABLET EVERY DAY 8/9/17  Yes Dorian Grover MD   rOPINIRole (REQUIP) 0.5 MG tablet Take 2 tablets by mouth nightly 8/3/17  Yes Dorian Grover MD   L-Arginine 500 MG CAPS Take 500 mg by mouth daily   Yes Historical Provider, MD   Multiple Vitamins-Minerals (MULTIVITAMIN ADULT PO) Take by mouth daily   Yes Historical Provider, MD   Calcium Carb-Cholecalciferol (CALCIUM 600+D3 PO) Take 1 tablet by mouth 2 times daily    Yes Historical Provider, MD   metFORMIN (GLUCOPHAGE) 500 MG tablet Take 1 tablet by mouth daily (with breakfast) 3/3/17  Yes Dorian Grover MD   metoprolol tartrate (LOPRESSOR) 25 MG tablet TAKE 1 TABLET TWICE DAILY  Patient taking differently: 25 mg 2 times daily Indications: take 1/2 tablet TAKE 1 TABLET TWICE DAILY 3/3/17  Yes Dorian Grover MD   fluvastatin (LESCOL XL) 80 MG extended release tablet Take 1 tablet by mouth daily 3/3/17  Yes Dorian Grover MD   Omega-3 Fatty Acids (FISH OIL) 1000 MG CAPS Take 1 capsule by mouth 2 times daily  Patient taking differently: Take 1,000 mg by mouth  3/3/17 11/16/17 Yes Dorian Grover MD   aspirin 81 MG tablet Take 81 mg by mouth nightly   Yes Historical Provider, MD   Cholecalciferol (VITAMIN D-3 PO) Take 1 capsule by mouth nightly   Yes Historical Provider, MD   diphenhydrAMINE (BENADRYL) 25 MG tablet Take 25 mg by mouth nightly as needed for Sleep    Yes Historical Provider, MD     Constitutional:  /70   Pulse 68   Ht 5' 2\" (1.575 m)   Wt 206 lb 3.2 oz (93.5 kg)   BMI 37.71 kg/m²    Body mass index is 37.71 kg/m². Wt Readings from Last 3 Encounters:   11/16/17 206 lb 3.2 oz (93.5 kg)   10/05/17 205 lb 9.6 oz (93.3 kg)   08/31/17 204 lb 3.2 oz (92.6 kg)     General Appearance: Moderately obese pleasant female awake alert oriented ×3 in no apparent distress     HEENT: Pupils are equal.     NECK: No JVP or carotid bruits     CHEST:  There is no chest wall tenderness.     Cardiovascular: Auscultation: Normal S1 and S2 . No audible murmurs noted.     Respiratory:  Breath sounds are Normal     Extremities:  No edema, clubbing or cyanosis     SKIN: Warm and well perfused, no pallor or cyanosis     Vascular exam:  Abdominal Aorta: Normal; Femoral Arteries: 2+ and equal; Pedal Pulses: 2+ and equal      Abdomen:  No masses or tenderness. No organomegaly noted.     Neurologic:  Oriented to time, place, and person and non-anxious. No focal neurological deficit noted.     Psychiatric:  Judgment/Insight and Mood     Pharmacological nuclear stress test on 10/12/2017 reported  Left ventricular perfusion is abnormal suggestive of small lateral wall  infarction without ischemia vs. artifact. Normal Left ventricular size, wall motion and systolic function. Ejection  fraction is 69 %. The abnormality looks more like an artifact than true perfusion defect. Echocardiogram on 10/12/2017 reported   Left ventricular systolic function is normal with an ejection fraction of   50-60%. Mild concentric left ventricular hypertrophy with Grade I diastolic   dysfunction. Increased LVOT and AoV velocities with increased mean gradient of 13 mmHg. Aortic valve is opening normally without signs of sclerosis. No evidence of pericardial effusion    30 day event monitor on 11/7/2017 reported   This is a event monitor report done from October 5 through November 3, 2017 to evaluate palpitations. Total of 23 transmissions are submitted to review and 22 of them are manually transmitted. Baseline rhythm is normal sinus rate of 60 bpm.  Patient reported dizziness and shortness of breath during normal rate and rhythm and also during occasional PACs and PVCs. One episode of what appears to be supraventricular tachycardia of nonsustained duration noted on October 26, 2017 at 9:17 AM and patient reported feeling dizzy at that time. No other significant tachycardia or bradyarrhythmias present. Conclusion: 30 day of event monitor findings suggestive of low-grade atrial and ventricular ectopy and multiple episodes of dizziness and shortness of breath during normal rate and rhythm. Isolated narrow complex tachycardia at a rate of 151 bpm occurred with reported symptoms of dizziness as well. Would recommend follow-up to discuss the results and further course of action. LAB REVIEW:  CBC:   Lab Results   Component Value Date    WBC 6.9 07/18/2017    HGB 14.1 07/18/2017    HCT 41.9 07/18/2017     07/18/2017     Lipids:   Lab Results   Component Value Date    CHOL 166 03/09/2017    TRIG 128 03/09/2017    HDL 47 03/09/2017    LDLCALC 62 09/15/2016    LDLDIRECT 115 (H) 03/09/2017     Renal:   Lab Results   Component Value Date    BUN 24 08/22/2017    CREATININE 1.3 08/22/2017     08/22/2017    K 4.0 08/22/2017     IMPRESSION and RECOMMENDATIONS:      VHD (valvular heart disease)  No significant valvular disease noted on recent echo. Hyperlipidemia  Continue aggressive statin therapy to aim for LDL of less than 70. She is to have follow-up lipids done by family physician.     Essential hypertension  Home blood pressure readings are reviewed and they are well within the normal range. Continue current medications. Continue to monitor blood pressure and bring the results on follow-up visit. DM (diabetes mellitus) ([address]  Follow-up with PCP. Hemoglobin A1c goal is 6. Chest heaviness  Patient reports her symptoms have resolved and she is not having it on a day-to-day basis. We will consider cardiac catheterization if she is having recurrent chest heaviness until then aggressive risk modification is counseled. In view of her renal insufficiency she is considered higher than normal risk for cardiac catheterization and interventions. Continue current cardiovascular medications which have been reviewed and discussed individually with you. Primary prevention is the goal by aggressive risk modification, healthy and therapeutic life style changes for cardiovascular risk reduction. Various goals are discussed and questions answered. Appropriate prescriptions if needed on this visit are addressed. After visit summery is provided. Questions answered and patient verbalizes understanding. Follow up in office in 12 months, sooner if needed. Cande Monge MD, 11/16/2017 11:08 AM     Please note this report has been partially produced using speech recognition software and may contain errors related to that system including errors in grammar, punctuation, and spelling, as well as words and phrases that may be inappropriate. If there are any questions or concerns please feel free to contact the dictating provider for clarification.

## 2017-11-16 NOTE — ASSESSMENT & PLAN NOTE
Home blood pressure readings are reviewed and they are well within the normal range. Continue current medications. Continue to monitor blood pressure and bring the results on follow-up visit.

## 2018-01-31 ENCOUNTER — HOSPITAL ENCOUNTER (OUTPATIENT)
Dept: GENERAL RADIOLOGY | Age: 83
Discharge: HOME | End: 2018-01-31
Attending: NURSE PRACTITIONER | Admitting: NURSE PRACTITIONER

## 2018-01-31 ENCOUNTER — OFFICE VISIT (OUTPATIENT)
Dept: FAMILY MEDICINE CLINIC | Age: 83
End: 2018-01-31

## 2018-01-31 VITALS
DIASTOLIC BLOOD PRESSURE: 76 MMHG | WEIGHT: 204 LBS | HEIGHT: 62 IN | TEMPERATURE: 98.1 F | BODY MASS INDEX: 37.54 KG/M2 | OXYGEN SATURATION: 96 % | SYSTOLIC BLOOD PRESSURE: 136 MMHG | HEART RATE: 67 BPM

## 2018-01-31 DIAGNOSIS — R05.9 COUGH: ICD-10-CM

## 2018-01-31 DIAGNOSIS — R05.9 COUGH: Primary | ICD-10-CM

## 2018-01-31 PROCEDURE — 99213 OFFICE O/P EST LOW 20 MIN: CPT | Performed by: NURSE PRACTITIONER

## 2018-01-31 RX ORDER — GUAIFENESIN 600 MG/1
600 TABLET, EXTENDED RELEASE ORAL 2 TIMES DAILY
Qty: 30 TABLET | Refills: 0 | Status: SHIPPED | OUTPATIENT
Start: 2018-01-31 | End: 2018-06-11

## 2018-01-31 RX ORDER — BENZONATATE 100 MG/1
100 CAPSULE ORAL 3 TIMES DAILY PRN
Qty: 21 CAPSULE | Refills: 0 | Status: SHIPPED | OUTPATIENT
Start: 2018-01-31 | End: 2018-02-07

## 2018-01-31 ASSESSMENT — ENCOUNTER SYMPTOMS
CHEST TIGHTNESS: 1
COUGH: 1
SINUS PRESSURE: 0
SHORTNESS OF BREATH: 1
DIARRHEA: 1
SINUS PAIN: 0
RHINORRHEA: 1
EYE ITCHING: 1
WHEEZING: 1
CONSTIPATION: 0
VOMITING: 0
NAUSEA: 1

## 2018-01-31 NOTE — PROGRESS NOTES
is warm and dry. No results found for requested labs within last 30 days. Hemoglobin A1C (%)   Date Value   03/03/2017 5.7     LDL Calculated (MG/DL)   Date Value   09/15/2016 62       Lab Results   Component Value Date    WBC 6.9 07/18/2017    WBC 5.7 03/09/2017    WBC 6.1 09/15/2016    HGB 14.1 07/18/2017    HGB 12.2 03/09/2017    HGB 12.1 09/15/2016    HCT 41.9 07/18/2017    HCT 38.5 03/09/2017    HCT 38.5 09/15/2016    MCV 84.6 07/18/2017    MCV 84.2 03/09/2017    MCV 84.6 09/15/2016     07/18/2017     03/09/2017     09/15/2016    SEGSABS 3.7 07/18/2017    SEGSABS 2.4 03/09/2017    SEGSABS 2.7 09/15/2016    LYMPHSABS 2.4 07/18/2017    MONOSABS 0.6 07/18/2017    EOSABS 0.2 07/18/2017    BASOSABS 0.1 07/18/2017     Lab Results   Component Value Date    TSHHS 1.800 07/18/2017     Lab Results   Component Value Date    LABALBU 4.2 07/18/2017    BILITOT 0.3 07/18/2017    AST 31 07/18/2017    ALT 21 07/18/2017    ALKPHOS 88 07/18/2017      ASSESSMENT AND PLAN:     1. Cough  - guaiFENesin (MUCINEX) 600 MG extended release tablet; Take 1 tablet by mouth 2 times daily  Dispense: 30 tablet; Refill: 0  - benzonatate (TESSALON PERLES) 100 MG capsule; Take 1 capsule by mouth 3 times daily as needed for Cough  Dispense: 21 capsule; Refill: 0  - XR CHEST STANDARD (2 VW); Future  - Encourage clear fluids without caffeine  - Smaller, more frequent meals to ensure hydration.   - Saline nasal spray, cool mist humidifier, prop head at night for congestion.   - May use spoonfuls of honey to coat throat. - Tylenol or ibuprofen as needed for fever, pain. - Counseled on signs of increased work of breathing.   - RTO if sxs increase or no improvement. Return if symptoms worsen or fail to improve. Care discussed with patient. Questions answered and patient verbalizes understanding and agrees with plan. Medications reviewed and reconciled. Continue current medications.   Appropriate

## 2018-01-31 NOTE — PATIENT INSTRUCTIONS
if:  ? · You have severe trouble breathing. ?Call your doctor now or seek immediate medical care if:  ? · You cough up blood. ? · You have new or worse trouble breathing. ? · You have a new or higher fever. ? · You have a new rash. ? Watch closely for changes in your health, and be sure to contact your doctor if:  ? · You cough more deeply or more often, especially if you notice more mucus or a change in the color of your mucus. ? · You have new symptoms, such as a sore throat, an earache, or sinus pain. ? · You do not get better as expected. Where can you learn more? Go to https://[email or web address].inpeeFolder.[email or web address]. org and sign in to your Styloola account. Enter D279 in the FatTail box to learn more about \"Cough: Care Instructions. \"     If you do not have an account, please click on the \"Sign Up Now\" link. Current as of: May 12, 2017  Content Version: 11.5  © 2421-9403 [employer]wise, Incorporated. Care instructions adapted under license by [facility] ([facility]). If you have questions about a medical condition or this instruction, always ask your healthcare professional. [address] any warranty or liability for your use of this information.

## 2018-02-01 ENCOUNTER — HOSPITAL ENCOUNTER (OUTPATIENT)
Dept: OTHER | Age: 83
Discharge: HOME | End: 2018-02-28
Attending: INTERNAL MEDICINE | Admitting: INTERNAL MEDICINE

## 2018-02-01 ASSESSMENT — ENCOUNTER SYMPTOMS: ABDOMINAL PAIN: 0

## 2018-02-06 ENCOUNTER — OFFICE VISIT (OUTPATIENT)
Dept: INTERNAL MEDICINE CLINIC | Age: 83
End: 2018-02-06

## 2018-02-06 VITALS
HEIGHT: 62 IN | HEART RATE: 60 BPM | RESPIRATION RATE: 16 BRPM | DIASTOLIC BLOOD PRESSURE: 74 MMHG | BODY MASS INDEX: 36.99 KG/M2 | SYSTOLIC BLOOD PRESSURE: 130 MMHG | WEIGHT: 201 LBS | OXYGEN SATURATION: 97 %

## 2018-02-06 DIAGNOSIS — R73.01 IMPAIRED FASTING GLUCOSE: ICD-10-CM

## 2018-02-06 DIAGNOSIS — K21.9 GASTROESOPHAGEAL REFLUX DISEASE, ESOPHAGITIS PRESENCE NOT SPECIFIED: ICD-10-CM

## 2018-02-06 DIAGNOSIS — Z13.0 SCREENING FOR DEFICIENCY ANEMIA: ICD-10-CM

## 2018-02-06 DIAGNOSIS — R00.2 INTERMITTENT PALPITATIONS: ICD-10-CM

## 2018-02-06 DIAGNOSIS — I10 ESSENTIAL HYPERTENSION: Primary | ICD-10-CM

## 2018-02-06 DIAGNOSIS — Z12.39 BREAST CANCER SCREENING: ICD-10-CM

## 2018-02-06 DIAGNOSIS — N18.31 CHRONIC KIDNEY DISEASE (CKD) STAGE G3A/A1, MODERATELY DECREASED GLOMERULAR FILTRATION RATE (GFR) BETWEEN 45-59 ML/MIN/1.73 SQUARE METER AND ALBUMINURIA CREATININE RATIO LESS THAN 30 MG/G (HCC): ICD-10-CM

## 2018-02-06 DIAGNOSIS — E03.9 ACQUIRED HYPOTHYROIDISM: ICD-10-CM

## 2018-02-06 DIAGNOSIS — E78.5 HYPERLIPIDEMIA, UNSPECIFIED HYPERLIPIDEMIA TYPE: ICD-10-CM

## 2018-02-06 DIAGNOSIS — G25.81 RESTLESS LEG SYNDROME: ICD-10-CM

## 2018-02-06 DIAGNOSIS — J20.9 ACUTE BRONCHITIS, UNSPECIFIED ORGANISM: ICD-10-CM

## 2018-02-06 PROCEDURE — 4040F PNEUMOC VAC/ADMIN/RCVD: CPT | Performed by: INTERNAL MEDICINE

## 2018-02-06 PROCEDURE — 1036F TOBACCO NON-USER: CPT | Performed by: INTERNAL MEDICINE

## 2018-02-06 PROCEDURE — 1123F ACP DISCUSS/DSCN MKR DOCD: CPT | Performed by: INTERNAL MEDICINE

## 2018-02-06 PROCEDURE — G8484 FLU IMMUNIZE NO ADMIN: HCPCS | Performed by: INTERNAL MEDICINE

## 2018-02-06 PROCEDURE — G8400 PT W/DXA NO RESULTS DOC: HCPCS | Performed by: INTERNAL MEDICINE

## 2018-02-06 PROCEDURE — 99214 OFFICE O/P EST MOD 30 MIN: CPT | Performed by: INTERNAL MEDICINE

## 2018-02-06 PROCEDURE — G8417 CALC BMI ABV UP PARAM F/U: HCPCS | Performed by: INTERNAL MEDICINE

## 2018-02-06 PROCEDURE — G8427 DOCREV CUR MEDS BY ELIG CLIN: HCPCS | Performed by: INTERNAL MEDICINE

## 2018-02-06 PROCEDURE — 1090F PRES/ABSN URINE INCON ASSESS: CPT | Performed by: INTERNAL MEDICINE

## 2018-02-06 RX ORDER — FLUVASTATIN SODIUM 80 MG/1
80 TABLET, FILM COATED, EXTENDED RELEASE ORAL DAILY
Qty: 90 TABLET | Refills: 1 | Status: SHIPPED | OUTPATIENT
Start: 2018-02-06 | End: 2018-08-17

## 2018-02-06 RX ORDER — AMLODIPINE BESYLATE 5 MG/1
TABLET ORAL
Qty: 90 TABLET | Refills: 3 | Status: SHIPPED | OUTPATIENT
Start: 2018-02-06 | End: 2018-11-06 | Stop reason: SDUPTHER

## 2018-02-06 RX ORDER — RANITIDINE 150 MG/1
150 TABLET ORAL 2 TIMES DAILY
Qty: 180 TABLET | Refills: 1 | Status: SHIPPED | OUTPATIENT
Start: 2018-02-06 | End: 2018-10-16 | Stop reason: SDUPTHER

## 2018-02-06 RX ORDER — DEXTROMETHORPHAN HYDROBROMIDE AND PROMETHAZINE HYDROCHLORIDE 15; 6.25 MG/5ML; MG/5ML
5 SYRUP ORAL 2 TIMES DAILY PRN
Qty: 118 ML | Refills: 0 | Status: SHIPPED | OUTPATIENT
Start: 2018-02-06 | End: 2018-02-20 | Stop reason: ALTCHOICE

## 2018-02-06 RX ORDER — LEVOTHYROXINE SODIUM 0.12 MG/1
125 TABLET ORAL DAILY
Qty: 90 TABLET | Refills: 2 | Status: SHIPPED | OUTPATIENT
Start: 2018-02-06 | End: 2018-09-20 | Stop reason: SDUPTHER

## 2018-02-06 RX ORDER — FUROSEMIDE 20 MG/1
20 TABLET ORAL EVERY OTHER DAY
Qty: 60 TABLET | Refills: 5 | Status: CANCELLED | OUTPATIENT
Start: 2018-02-06

## 2018-02-06 RX ORDER — LOSARTAN POTASSIUM 50 MG/1
50 TABLET ORAL 2 TIMES DAILY
Qty: 180 TABLET | Refills: 1 | Status: SHIPPED | OUTPATIENT
Start: 2018-02-06 | End: 2018-08-08 | Stop reason: SDUPTHER

## 2018-02-06 RX ORDER — ROPINIROLE 0.5 MG/1
1 TABLET, FILM COATED ORAL NIGHTLY
Qty: 180 TABLET | Refills: 1 | Status: SHIPPED | OUTPATIENT
Start: 2018-02-06

## 2018-02-06 RX ORDER — DOXYCYCLINE 100 MG/1
100 CAPSULE ORAL 2 TIMES DAILY
Qty: 20 CAPSULE | Refills: 0 | Status: SHIPPED | OUTPATIENT
Start: 2018-02-06 | End: 2018-02-16

## 2018-02-06 NOTE — PROGRESS NOTES
of Protonix. Colon cancer screening:   FIT is negative for occult blood on March 10, 2017. Breast cancer screening: Diagnostic mammogram and ultrasound of right breast in 3/2017 showed 0.5 cm simple cyst.  F/u advised in 1 year. Mother had breast removed b/l due to precancerous lesion. Labs reviewed with patient. TFts have normalized. BMP/ Mg is stable. Past Medical History:        Diagnosis Date    Abnormal ECG 10/5/2017    Acquired hypothyroidism 3/5/2017    Chest heaviness 10/5/2017    Chronic kidney disease     CKD (chronic kidney disease), stage III 3/5/2017    Essential hypertension 3/5/2017    Gastroesophageal reflux disease 3/5/2017    Heart palpitations 10/5/2017    History of nuclear stress test 10/12/2017    cardiolite-EF69%,small lateral wall ischemia    Holter monitor, abnormal 10/05/2017    Hx of Doppler echocardiogram 10/11/2017    EF50-60%,increased LVOT and AOV    Hx of echocardiogram 10/06/2016    EF54% Mild to mod MR, See media    Hyperlipidemia 3/5/2017    Hypothyroidism     Impaired fasting glucose 3/5/2017    Restless leg syndrome 3/5/2017       Past Surgical History:        Procedure Laterality Date    CATARACT REMOVAL Bilateral     CHOLECYSTECTOMY      COLONOSCOPY      EYE SURGERY      HYSTERECTOMY      KNEE SURGERY Bilateral        Social History:   Social History   Substance Use Topics    Smoking status: Never Smoker    Smokeless tobacco: Never Used    Alcohol use No       Family History:       Problem Relation Age of Onset    Heart Disease Mother     Breast Cancer Mother     Heart Disease Father     Diabetes Father     High Blood Pressure Father     Cancer Father     Stroke Father     High Blood Pressure Paternal Grandmother        Allergies:  Codeine; Lipitor [atorvastatin]; Penicillins;  Phenobarbital; Vancomycin; Zocor [simvastatin]; and Sulfa antibiotics    Current Medications :      Prior to Admission medications    Medication Sig Start Date Clear to auscultation bilaterally, respirations unlabored, accessory muscles are not used. HEART:     Regular rate and rhythm, S1 and S2 normal, no murmur, rub or gallop. PMI in MCL. ABDOMEN:    Soft, non-tender, bowel sounds are normoactive, no masses, no hepatospleenomegaly. EXTREMITY:   no cyanosis, clubbing. Trace bipedal edema  NEURO:  Alert, oriented to person, place and time. Grossly intact. Gait steady. PSYCH:  Mood euthymic, insight and judgement good, no SI or HI.           ASSESSMENT/PLAN:        ICD-10-CM ICD-9-CM    1. Essential hypertension I10 401.9 amLODIPine (NORVASC) 5 MG tablet      metoprolol tartrate (LOPRESSOR) 25 MG tablet   2. Hyperlipidemia, unspecified hyperlipidemia type E78.5 272.4 fluvastatin (LESCOL XL) 80 MG extended release tablet      Lipid Panel      Hepatic Function Panel   3. Acquired hypothyroidism E03.9 244.9 levothyroxine (SYNTHROID) 125 MCG tablet   4. Acute bronchitis, unspecified organism J20.9 466.0 promethazine-dextromethorphan (PROMETHAZINE-DM) 6.25-15 MG/5ML syrup      doxycycline monohydrate (MONODOX) 100 MG capsule   5. Chronic kidney disease (CKD) stage G3a/A1, moderately decreased glomerular filtration rate (GFR) between 45-59 mL/min/1.73 square meter and albuminuria creatinine ratio less than 30 mg/g N18.3 585.3    6. Intermittent palpitations R00.2 785.1    7. Impaired fasting glucose R73.01 790.21 metFORMIN (GLUCOPHAGE) 500 MG tablet      Hemoglobin A1C   8. Gastroesophageal reflux disease, esophagitis presence not specified K21.9 530.81 ranitidine (ZANTAC) 150 MG tablet   9. Restless leg syndrome G25.81 333.94 rOPINIRole (REQUIP) 0.5 MG tablet   10. Screening for deficiency anemia Z13.0 V78.1 CBC Auto Differential   11. Breast cancer screening Z12.31 V76.10 HARPER DIGITAL SCREEN W CAD BILATERAL        Episodes of tachycardia: ?  Paroxysmal A. fib versus SVT, since she is off beta blockers. Restart Lopressor 12.5 mg twice a day. Continue losartan.   Takes Chest heaviness 10/5/2017    Chronic kidney disease     CKD (chronic kidney disease), stage III 3/5/2017    Essential hypertension 3/5/2017    Gastroesophageal reflux disease 3/5/2017    Heart palpitations 10/5/2017    History of nuclear stress test 10/12/2017    cardiolite-EF69%,small lateral wall ischemia    Holter monitor, abnormal 10/05/2017    Hx of Doppler echocardiogram 10/11/2017    EF50-60%,increased LVOT and AOV    Hx of echocardiogram 10/06/2016    EF54% Mild to mod MR, See media    Hyperlipidemia 3/5/2017    Hypothyroidism     Impaired fasting glucose 3/5/2017    Restless leg syndrome 3/5/2017       Past Surgical History:        Procedure Laterality Date    CATARACT REMOVAL Bilateral     CHOLECYSTECTOMY      COLONOSCOPY      EYE SURGERY      HYSTERECTOMY      KNEE SURGERY Bilateral        Social History:   Social History   Substance Use Topics    Smoking status: Never Smoker    Smokeless tobacco: Never Used    Alcohol use No       Family History:       Problem Relation Age of Onset    Heart Disease Mother     Breast Cancer Mother     Heart Disease Father     Diabetes Father     High Blood Pressure Father     Cancer Father     Stroke Father     High Blood Pressure Paternal Grandmother        Allergies:  Codeine; Lipitor [atorvastatin]; Penicillins; Phenobarbital; Vancomycin; Zocor [simvastatin]; and Sulfa antibiotics    Current Medications :      Prior to Admission medications    Medication Sig Start Date End Date Taking?  Authorizing Provider   amLODIPine (NORVASC) 5 MG tablet TAKE 1 TABLET BY MOUTH at night every day and in morning if SBP > 150 2/6/18  Yes Lee House MD   fluvastatin (LESCOL XL) 80 MG extended release tablet Take 1 tablet by mouth daily 2/6/18  Yes Lee House MD   levothyroxine (SYNTHROID) 125 MCG tablet Take 1 tablet by mouth daily 2/6/18  Yes Lee House MD   losartan (COZAAR) 50 MG tablet Take 1 tablet by mouth 2 times daily 2/6/18  Yes Elizabeth Slater MD   metFORMIN (GLUCOPHAGE) 500 MG tablet Take 1 tablet by mouth 2 times daily (with meals) 2/6/18  Yes Elizabeth Slater MD   metoprolol tartrate (LOPRESSOR) 25 MG tablet Take 1 tablet by mouth 2 times daily 2/6/18  Yes Elizabeth Slater MD   ranitidine (ZANTAC) 150 MG tablet Take 1 tablet by mouth 2 times daily 2/6/18  Yes Elizabeth Slater MD   rOPINIRole (REQUIP) 0.5 MG tablet Take 2 tablets by mouth nightly 2/6/18  Yes Elizabeth Slater MD   promethazine-dextromethorphan (PROMETHAZINE-DM) 6.25-15 MG/5ML syrup Take 5 mLs by mouth 2 times daily as needed for Cough 2/6/18  Yes Elizabeth Slater MD   doxycycline monohydrate (MONODOX) 100 MG capsule Take 1 capsule by mouth 2 times daily for 10 days 2/6/18 2/16/18 Yes Elizabeth Slater MD   guaiFENesin (MUCINEX) 600 MG extended release tablet Take 1 tablet by mouth 2 times daily 1/31/18  Yes Lorelei Toth CNP   docusate sodium (COLACE) 100 MG capsule Take 100 mg by mouth nightly    Yes Historical Provider, MD   L-Arginine 500 MG CAPS Take 500 mg by mouth daily   Yes Historical Provider, MD   Multiple Vitamins-Minerals (MULTIVITAMIN ADULT PO) Take by mouth daily   Yes Historical Provider, MD   Calcium Carb-Cholecalciferol (CALCIUM 600+D3 PO) Take 1 tablet by mouth 2 times daily    Yes Historical Provider, MD   aspirin 81 MG tablet Take 81 mg by mouth nightly   Yes Historical Provider, MD   Cholecalciferol (VITAMIN D-3 PO) Take 1 capsule by mouth nightly   Yes Historical Provider, MD   Omega-3 Fatty Acids (FISH OIL) 1000 MG CAPS Take 1 capsule by mouth 2 times daily  Patient taking differently: Take 1,000 mg by mouth  3/3/17 12/4/17  Elizabeth Slater MD       LAB DATA:    CBC:   Lab Results   Component Value Date    WBC 6.8 02/09/2018    HGB 12.8 02/09/2018     02/09/2018     Renal:   Lab Results   Component Value Date    BUN 24 08/22/2017    CREATININE 1.3 08/22/2017     08/22/2017    K 4.0 08/22/2017     PT/INR:  No results found for: PTINR  HbA1c:   Lab Results   Component Value Date    LABA1C 5.4 02/09/2018    LABA1C 5.7 03/03/2017     Urine for microalbumin:   Lab Results   Component Value Date    CREATININE 1.3 (H) 08/22/2017     LIPID:   Lab Results   Component Value Date    CHOL 161 02/09/2018    TRIG 207 (H) 02/09/2018    HDL 45 02/09/2018    LDLCALC 62 09/15/2016    LDLDIRECT 92 02/09/2018     LFT:   Lab Results   Component Value Date    ALT 15 02/09/2018    AST 21 02/09/2018     TSH:  No results found for: TSH    Lab Results   Component Value Date    TSHHS 1.800 07/18/2017     Free T4 / Free T3:   Lab Results   Component Value Date    FT3 2.8 07/23/2015    T4FREE 1.28 04/28/2017       REVIEW OF SYSTEMS:      CONSTITUTIONAL: Denies any fever, chill, fatigue, weight changes. RESPIRATORY: The patient denies cough, chest congestion, hemoptysis or shortness of breath. CARDIOVASCULAR: The patient denies chest pain, palpitations. NEUROLOGIC: The patient denies any focal neurologic deficit. PHYSICAL EXAMINATION:     /74 (Site: Right Arm, Position: Sitting, Cuff Size: Medium Adult)   Pulse 60   Resp 16   Ht 5' 2\" (1.575 m)   Wt 201 lb (91.2 kg)   SpO2 97%   BMI 36.76 kg/m²      GENERAL APPEARANCE:    Alert, oriented x 3, well developed, cooperative, not in any distress, appears stated age. HEAD:   Normocephalic, atraumatic   EYES:   Pupils are round, EOMI, lids normal, conjunctivea clear, sclera anicteric. NECK:    Supple, symmetrical,  trachea midline, no thyromegaly, no carotid bruit or JVD, no lymphadenopathy. LUNGS:    Clear to auscultation bilaterally, respirations unlabored, accessory muscles are not used. HEART:     Regular rate and rhythm, S1 and S2 normal, no murmur, rub or gallop. PMI in MCL. ABDOMEN:    Soft, non-tender, bowel sounds are normoactive, no masses, no hepatospleenomegaly. EXTREMITY:   no cyanosis, clubbing. no bipedal edema  NEURO:  Alert, oriented to person, place and time.      Grossly

## 2018-02-09 ENCOUNTER — TELEPHONE (OUTPATIENT)
Dept: FAMILY MEDICINE CLINIC | Age: 83
End: 2018-02-09

## 2018-02-09 LAB
ALBUMIN SERPL-MCNC: 4.1 GM/DL (ref 3.4–5)
ALP BLD-CCNC: 64 IU/L (ref 40–129)
ALT SERPL-CCNC: 15 U/L (ref 10–40)
AST SERPL-CCNC: 21 IU/L (ref 15–37)
BASOPHILS ABSOLUTE: 0.1 K/CU MM
BASOPHILS RELATIVE PERCENT: 0.9 % (ref 0–1)
BILIRUB SERPL-MCNC: 0.2 MG/DL (ref 0–1)
BILIRUBIN DIRECT: 0.2 MG/DL (ref 0–0.3)
BILIRUBIN, INDIRECT: 0 MG/DL (ref 0–0.7)
CHOLESTEROL: 161 MG/DL
DIFFERENTIAL TYPE: ABNORMAL
EOSINOPHILS ABSOLUTE: 0.3 K/CU MM
EOSINOPHILS RELATIVE PERCENT: 4.6 % (ref 0–3)
ESTIMATED AVERAGE GLUCOSE: 108 MG/DL
HBA1C MFR BLD: 5.4 % (ref 4.2–6.3)
HCT VFR BLD CALC: 38.7 % (ref 37–47)
HDLC SERPL-MCNC: 45 MG/DL
HEMOGLOBIN: 12.8 GM/DL (ref 12.5–16)
IMMATURE NEUTROPHIL %: 0.1 % (ref 0–0.43)
LDL CHOLESTEROL DIRECT: 92 MG/DL
LYMPHOCYTES ABSOLUTE: 2.7 K/CU MM
LYMPHOCYTES RELATIVE PERCENT: 39.2 % (ref 24–44)
MCH RBC QN AUTO: 29 PG (ref 27–31)
MCHC RBC AUTO-ENTMCNC: 33.1 % (ref 32–36)
MCV RBC AUTO: 87.6 FL (ref 78–100)
MONOCYTES ABSOLUTE: 0.5 K/CU MM
MONOCYTES RELATIVE PERCENT: 7.7 % (ref 0–4)
NUCLEATED RBC %: 0 %
PDW BLD-RTO: 12.6 % (ref 11.7–14.9)
PLATELET # BLD: 220 K/CU MM (ref 140–440)
PMV BLD AUTO: 11.1 FL (ref 7.5–11.1)
RBC # BLD: 4.42 M/CU MM (ref 4.2–5.4)
SEGMENTED NEUTROPHILS ABSOLUTE COUNT: 3.2 K/CU MM
SEGMENTED NEUTROPHILS RELATIVE PERCENT: 47.5 % (ref 36–66)
TOTAL IMMATURE NEUTOROPHIL: 0.01 K/CU MM
TOTAL NUCLEATED RBC: 0 K/CU MM
TOTAL PROTEIN: 6.2 GM/DL (ref 6.4–8.2)
TRIGL SERPL-MCNC: 207 MG/DL
WBC # BLD: 6.8 K/CU MM (ref 4–10.5)

## 2018-02-11 ENCOUNTER — TELEPHONE (OUTPATIENT)
Dept: FAMILY MEDICINE CLINIC | Age: 83
End: 2018-02-11

## 2018-02-20 ENCOUNTER — OFFICE VISIT (OUTPATIENT)
Dept: INTERNAL MEDICINE CLINIC | Age: 83
End: 2018-02-20

## 2018-02-20 VITALS
RESPIRATION RATE: 16 BRPM | WEIGHT: 202 LBS | SYSTOLIC BLOOD PRESSURE: 120 MMHG | OXYGEN SATURATION: 98 % | HEIGHT: 62 IN | BODY MASS INDEX: 37.17 KG/M2 | DIASTOLIC BLOOD PRESSURE: 80 MMHG | HEART RATE: 83 BPM

## 2018-02-20 DIAGNOSIS — Z23 NEED FOR VACCINATION AGAINST STREPTOCOCCUS PNEUMONIAE: Primary | ICD-10-CM

## 2018-02-20 DIAGNOSIS — L30.4 INTERTRIGO: ICD-10-CM

## 2018-02-20 DIAGNOSIS — J45.20 MILD INTERMITTENT REACTIVE AIRWAY DISEASE WITHOUT COMPLICATION: ICD-10-CM

## 2018-02-20 DIAGNOSIS — Z00.00 ROUTINE GENERAL MEDICAL EXAMINATION AT A HEALTH CARE FACILITY: ICD-10-CM

## 2018-02-20 DIAGNOSIS — Z23 NEED FOR TDAP VACCINATION: ICD-10-CM

## 2018-02-20 PROCEDURE — G0438 PPPS, INITIAL VISIT: HCPCS | Performed by: INTERNAL MEDICINE

## 2018-02-20 RX ORDER — NYSTATIN 100000 [USP'U]/G
POWDER TOPICAL
Qty: 1 BOTTLE | Refills: 2 | Status: SHIPPED | OUTPATIENT
Start: 2018-02-20 | End: 2018-06-11

## 2018-02-20 RX ORDER — MONTELUKAST SODIUM 10 MG/1
10 TABLET ORAL DAILY
Qty: 30 TABLET | Refills: 3 | Status: SHIPPED | OUTPATIENT
Start: 2018-02-20 | End: 2018-06-11

## 2018-02-20 RX ORDER — BACITRACIN ZINC AND POLYMYXIN B SULFATE 500; 1000 [USP'U]/G; [USP'U]/G
OINTMENT TOPICAL
Qty: 28.35 G | Refills: 3 | Status: SHIPPED | OUTPATIENT
Start: 2018-02-20 | End: 2018-06-11

## 2018-02-20 ASSESSMENT — LIFESTYLE VARIABLES: HOW OFTEN DO YOU HAVE A DRINK CONTAINING ALCOHOL: 0

## 2018-02-20 ASSESSMENT — PATIENT HEALTH QUESTIONNAIRE - PHQ9: SUM OF ALL RESPONSES TO PHQ QUESTIONS 1-9: 0

## 2018-02-20 ASSESSMENT — ANXIETY QUESTIONNAIRES: GAD7 TOTAL SCORE: 0

## 2018-02-28 NOTE — PROGRESS NOTES
Medicare Annual Wellness Visit  Name: Fadia Espinoza Date: 2018   MRN: [id number] Sex: Female   Age: 81 y.o. Ethnicity: Non-/Non    : 1935 Race: Jose Hodges is here for Medicare AWV    Screenings for behavioral, psychosocial and functional/safety risks, and cognitive dysfunction are all negative except as indicated below. These results, as well as other patient data from the [address] form, are documented in Flowsheets linked to this Encounter. Allergies   Allergen Reactions    Codeine Anaphylaxis    Lipitor [Atorvastatin] Anaphylaxis and Other (See Comments)    Penicillins     Phenobarbital     Vancomycin Other (See Comments)     Red Man Syndrome    Zocor [Simvastatin] Other (See Comments)     Muscle pain    Sulfa Antibiotics Nausea And Vomiting     Prior to Visit Medications    Medication Sig Taking? Authorizing Provider   bacitracin-polymyxin b (POLYSPORIN) [id number] UNIT/GM ointment Apply topically 2 times daily. Yes Lynsey Aguilar MD   nystatin (MYCOSTATIN) 304700 UNIT/GM powder Apply topically 4 times daily.  Yes Lynsey Aguilar MD   montelukast (SINGULAIR) 10 MG tablet Take 1 tablet by mouth daily Yes Lynsey Aguilar MD   amLODIPine (NORVASC) 5 MG tablet TAKE 1 TABLET BY MOUTH at night every day and in morning if SBP > 150 Yes Diaz Valadez MD   fluvastatin (LESCOL XL) 80 MG extended release tablet Take 1 tablet by mouth daily Yes Lynsey Aguilar MD   levothyroxine (SYNTHROID) 125 MCG tablet Take 1 tablet by mouth daily Yes Lynsey Aguilar MD   losartan (COZAAR) 50 MG tablet Take 1 tablet by mouth 2 times daily Yes Lynsey Aguilar MD   metoprolol tartrate (LOPRESSOR) 25 MG tablet Take 1 tablet by mouth 2 times daily Yes Lynsey Aguilar MD   ranitidine (ZANTAC) 150 MG tablet Take 1 tablet by mouth 2 times daily Yes Lynsey Aguilar MD   rOPINIRole (REQUIP) 0.5 MG tablet Take 2 tablets by mouth nightly Yes Lynsey Aguilar MD guaiFENesin (MUCINEX) 600 MG extended release tablet Take 1 tablet by mouth 2 times daily Yes Sander Driver CNP   docusate sodium (COLACE) 100 MG capsule Take 100 mg by mouth nightly  Yes Historical Provider, MD   L-Arginine 500 MG CAPS Take 500 mg by mouth daily Yes Historical Provider, MD   Multiple Vitamins-Minerals (MULTIVITAMIN ADULT PO) Take by mouth daily Yes Historical Provider, MD   Calcium Carb-Cholecalciferol (CALCIUM 600+D3 PO) Take 1 tablet by mouth 2 times daily  Yes Historical Provider, MD   aspirin 81 MG tablet Take 81 mg by mouth nightly Yes Historical Provider, MD   Cholecalciferol (VITAMIN D-3 PO) Take 1 capsule by mouth nightly Yes Historical Provider, MD   Omega-3 Fatty Acids (FISH OIL) 1000 MG CAPS Take 1 capsule by mouth 2 times daily  Patient taking differently: Take 1,000 mg by mouth   Baldo Rojo MD     Past Medical History:   Diagnosis Date    Abnormal ECG 10/5/2017    Acquired hypothyroidism 3/5/2017    Chest heaviness 10/5/2017    Chronic kidney disease     CKD (chronic kidney disease), stage III 3/5/2017    Essential hypertension 3/5/2017    Gastroesophageal reflux disease 3/5/2017    Heart palpitations 10/5/2017    History of nuclear stress test 10/12/2017    cardiolite-EF69%,small lateral wall ischemia    Holter monitor, abnormal 10/05/2017    Hx of Doppler echocardiogram 10/11/2017    EF50-60%,increased LVOT and AOV    Hx of echocardiogram 10/06/2016    EF54% Mild to mod MR, See media    Hyperlipidemia 3/5/2017    Hypothyroidism     Impaired fasting glucose 3/5/2017    Restless leg syndrome 3/5/2017     Past Surgical History:   Procedure Laterality Date    CATARACT REMOVAL Bilateral     CHOLECYSTECTOMY      COLONOSCOPY      EYE SURGERY      HYSTERECTOMY      KNEE SURGERY Bilateral      Family History   Problem Relation Age of Onset    Heart Disease Mother     Breast Cancer Mother     Heart Disease Father     Diabetes Father     High Blood frequency per FRAX score)  Completed    Flu vaccine  Completed     Recommendations for Preventive Services Due: see orders.   Recommended screening schedule for the next 5-10 years is provided to the patient in written form: see Patient Instructions/AVS.

## 2018-03-01 ENCOUNTER — HOSPITAL ENCOUNTER (OUTPATIENT)
Dept: OTHER | Age: 83
Discharge: HOME | End: 2018-03-31
Attending: INTERNAL MEDICINE | Admitting: INTERNAL MEDICINE

## 2018-03-30 ENCOUNTER — HOSPITAL ENCOUNTER (OUTPATIENT)
Dept: WOMENS IMAGING | Age: 83
Discharge: HOME | End: 2018-03-30
Attending: INTERNAL MEDICINE | Admitting: INTERNAL MEDICINE

## 2018-03-30 DIAGNOSIS — Z12.39 BREAST CANCER SCREENING: ICD-10-CM

## 2018-03-31 ENCOUNTER — TELEPHONE (OUTPATIENT)
Dept: INTERNAL MEDICINE CLINIC | Age: 83
End: 2018-03-31

## 2018-03-31 DIAGNOSIS — R92.8 ABNORMAL MAMMOGRAM OF RIGHT BREAST: Primary | ICD-10-CM

## 2018-04-04 ENCOUNTER — HOSPITAL ENCOUNTER (OUTPATIENT)
Dept: ULTRASOUND IMAGING | Age: 83
Discharge: HOME | End: 2018-04-04
Attending: INTERNAL MEDICINE | Admitting: INTERNAL MEDICINE

## 2018-04-04 DIAGNOSIS — R92.8 ABNORMAL MAMMOGRAM: ICD-10-CM

## 2018-05-01 ENCOUNTER — HOSPITAL ENCOUNTER (OUTPATIENT)
Dept: OTHER | Age: 83
Discharge: HOME | End: 2018-05-31
Attending: INTERNAL MEDICINE | Admitting: INTERNAL MEDICINE

## 2018-05-08 ENCOUNTER — OFFICE VISIT (OUTPATIENT)
Dept: INTERNAL MEDICINE CLINIC | Age: 83
End: 2018-05-08

## 2018-05-08 VITALS
BODY MASS INDEX: 34.15 KG/M2 | WEIGHT: 200 LBS | HEIGHT: 64 IN | SYSTOLIC BLOOD PRESSURE: 101 MMHG | RESPIRATION RATE: 12 BRPM | DIASTOLIC BLOOD PRESSURE: 60 MMHG | HEART RATE: 64 BPM | OXYGEN SATURATION: 97 %

## 2018-05-08 DIAGNOSIS — R00.2 HEART PALPITATIONS: ICD-10-CM

## 2018-05-08 DIAGNOSIS — K21.9 GASTROESOPHAGEAL REFLUX DISEASE, ESOPHAGITIS PRESENCE NOT SPECIFIED: ICD-10-CM

## 2018-05-08 DIAGNOSIS — R20.0 NUMBNESS AND TINGLING IN RIGHT HAND: Primary | ICD-10-CM

## 2018-05-08 DIAGNOSIS — I10 ESSENTIAL HYPERTENSION: ICD-10-CM

## 2018-05-08 DIAGNOSIS — E78.5 HYPERLIPIDEMIA, UNSPECIFIED HYPERLIPIDEMIA TYPE: ICD-10-CM

## 2018-05-08 DIAGNOSIS — G25.81 RESTLESS LEG SYNDROME: ICD-10-CM

## 2018-05-08 DIAGNOSIS — K63.5 POLYP OF COLON, UNSPECIFIED PART OF COLON, UNSPECIFIED TYPE: ICD-10-CM

## 2018-05-08 DIAGNOSIS — R73.01 IMPAIRED FASTING GLUCOSE: ICD-10-CM

## 2018-05-08 DIAGNOSIS — R20.2 NUMBNESS AND TINGLING IN RIGHT HAND: Primary | ICD-10-CM

## 2018-05-08 DIAGNOSIS — N18.31 CHRONIC KIDNEY DISEASE (CKD) STAGE G3A/A1, MODERATELY DECREASED GLOMERULAR FILTRATION RATE (GFR) BETWEEN 45-59 ML/MIN/1.73 SQUARE METER AND ALBUMINURIA CREATININE RATIO LESS THAN 30 MG/G (HCC): ICD-10-CM

## 2018-05-08 DIAGNOSIS — E03.9 ACQUIRED HYPOTHYROIDISM: ICD-10-CM

## 2018-05-08 PROCEDURE — G8417 CALC BMI ABV UP PARAM F/U: HCPCS | Performed by: INTERNAL MEDICINE

## 2018-05-08 PROCEDURE — 1036F TOBACCO NON-USER: CPT | Performed by: INTERNAL MEDICINE

## 2018-05-08 PROCEDURE — G8427 DOCREV CUR MEDS BY ELIG CLIN: HCPCS | Performed by: INTERNAL MEDICINE

## 2018-05-08 PROCEDURE — 1123F ACP DISCUSS/DSCN MKR DOCD: CPT | Performed by: INTERNAL MEDICINE

## 2018-05-08 PROCEDURE — 99214 OFFICE O/P EST MOD 30 MIN: CPT | Performed by: INTERNAL MEDICINE

## 2018-05-08 PROCEDURE — 4040F PNEUMOC VAC/ADMIN/RCVD: CPT | Performed by: INTERNAL MEDICINE

## 2018-05-08 PROCEDURE — 1090F PRES/ABSN URINE INCON ASSESS: CPT | Performed by: INTERNAL MEDICINE

## 2018-05-08 PROCEDURE — G8400 PT W/DXA NO RESULTS DOC: HCPCS | Performed by: INTERNAL MEDICINE

## 2018-05-31 LAB
ANION GAP SERPL CALCULATED.3IONS-SCNC: 11 MMOL/L (ref 4–16)
BUN BLDV-MCNC: 18 MG/DL (ref 6–23)
CALCIUM SERPL-MCNC: 9.9 MG/DL (ref 8.3–10.6)
CHLORIDE BLD-SCNC: 106 MMOL/L (ref 99–110)
CO2: 26 MMOL/L (ref 21–32)
CREAT SERPL-MCNC: 1.2 MG/DL (ref 0.6–1.1)
GFR AFRICAN AMERICAN: 52 ML/MIN/1.73M2
GFR NON-AFRICAN AMERICAN: 43 ML/MIN/1.73M2
GLUCOSE BLD-MCNC: 112 MG/DL (ref 70–99)
MAGNESIUM: 2.2 MG/DL (ref 1.8–2.4)
POTASSIUM SERPL-SCNC: 4.3 MMOL/L (ref 3.5–5.1)
SODIUM BLD-SCNC: 143 MMOL/L (ref 135–145)

## 2018-07-16 ENCOUNTER — OFFICE VISIT (OUTPATIENT)
Dept: GASTROENTEROLOGY | Age: 83
End: 2018-07-16

## 2018-07-16 VITALS
WEIGHT: 205.4 LBS | SYSTOLIC BLOOD PRESSURE: 166 MMHG | HEART RATE: 58 BPM | OXYGEN SATURATION: 97 % | HEIGHT: 62 IN | BODY MASS INDEX: 37.8 KG/M2 | DIASTOLIC BLOOD PRESSURE: 98 MMHG

## 2018-07-16 DIAGNOSIS — Z86.010 HISTORY OF COLON POLYPS: Primary | ICD-10-CM

## 2018-07-16 DIAGNOSIS — Z80.0 FAMILY HISTORY OF COLON CANCER: ICD-10-CM

## 2018-07-16 PROCEDURE — 99213 OFFICE O/P EST LOW 20 MIN: CPT | Performed by: NURSE PRACTITIONER

## 2018-07-16 PROCEDURE — G8427 DOCREV CUR MEDS BY ELIG CLIN: HCPCS | Performed by: NURSE PRACTITIONER

## 2018-07-16 PROCEDURE — G8417 CALC BMI ABV UP PARAM F/U: HCPCS | Performed by: NURSE PRACTITIONER

## 2018-07-16 PROCEDURE — 1123F ACP DISCUSS/DSCN MKR DOCD: CPT | Performed by: NURSE PRACTITIONER

## 2018-07-16 PROCEDURE — 1101F PT FALLS ASSESS-DOCD LE1/YR: CPT | Performed by: NURSE PRACTITIONER

## 2018-07-16 PROCEDURE — 1090F PRES/ABSN URINE INCON ASSESS: CPT | Performed by: NURSE PRACTITIONER

## 2018-07-16 PROCEDURE — 4040F PNEUMOC VAC/ADMIN/RCVD: CPT | Performed by: NURSE PRACTITIONER

## 2018-07-16 PROCEDURE — 1036F TOBACCO NON-USER: CPT | Performed by: NURSE PRACTITIONER

## 2018-07-16 PROCEDURE — G8400 PT W/DXA NO RESULTS DOC: HCPCS | Performed by: NURSE PRACTITIONER

## 2018-07-16 RX ORDER — POLYETHYLENE GLYCOL 3350 17 G/17G
238 POWDER, FOR SOLUTION ORAL ONCE
Qty: 1 BOTTLE | Refills: 0 | Status: SHIPPED | OUTPATIENT
Start: 2018-07-16 | End: 2018-07-16

## 2018-07-16 ASSESSMENT — ENCOUNTER SYMPTOMS
BACK PAIN: 0
SHORTNESS OF BREATH: 0
BLOOD IN STOOL: 0
WHEEZING: 0
SPUTUM PRODUCTION: 0
EYE PAIN: 0
CONSTIPATION: 0
ABDOMINAL PAIN: 0
BLURRED VISION: 0
NAUSEA: 0
DIARRHEA: 0
HEARTBURN: 1
VOMITING: 0
DOUBLE VISION: 0
COUGH: 0

## 2018-07-16 NOTE — PROGRESS NOTES
by mouth 2 times daily 180 tablet 1    metoprolol tartrate (LOPRESSOR) 25 MG tablet Take 1 tablet by mouth 2 times daily 180 tablet 1    ranitidine (ZANTAC) 150 MG tablet Take 1 tablet by mouth 2 times daily 180 tablet 1    rOPINIRole (REQUIP) 0.5 MG tablet Take 2 tablets by mouth nightly 180 tablet 1    docusate sodium (COLACE) 100 MG capsule Take 100 mg by mouth nightly       Multiple Vitamins-Minerals (MULTIVITAMIN ADULT PO) Take by mouth daily      Calcium Carb-Cholecalciferol (CALCIUM 600+D3 PO) Take 1 tablet by mouth 2 times daily       aspirin 81 MG tablet Take 81 mg by mouth nightly      Cholecalciferol (VITAMIN D-3 PO) Take 1 capsule by mouth nightly      fluvastatin (LESCOL XL) 80 MG extended release tablet Take 1 tablet by mouth daily 90 tablet 1    L-Arginine 500 MG CAPS Take 500 mg by mouth daily      Omega-3 Fatty Acids (FISH OIL) 1000 MG CAPS Take 1 capsule by mouth 2 times daily (Patient taking differently: Take 1,000 mg by mouth ) 180 capsule 3     No current facility-administered medications for this visit. Past medical history:    has a past medical history of Abnormal ECG; Acquired hypothyroidism; Chest heaviness; Chronic kidney disease; CKD (chronic kidney disease), stage III; Essential hypertension; Gastroesophageal reflux disease; Heart palpitations; History of nuclear stress test; Holter monitor, abnormal; Hx of Doppler echocardiogram; Hx of echocardiogram; Hyperlipidemia; Hypothyroidism; Impaired fasting glucose; and Restless leg syndrome. Past surgical history:   has a past surgical history that includes Hysterectomy; knee surgery (Bilateral); Cataract removal (Bilateral); eye surgery; Colonoscopy; and Cholecystectomy. Social History:   reports that she has never smoked. She has never used smokeless tobacco. She reports that she does not drink alcohol or use drugs.     Family history:  family history includes Breast Cancer in her mother; Cancer in her father; Diabetes Final    Magnesium 05/31/2018 2.2  1.8 - 2.4 mg/dl Final       Assessment        ICD-10-CM ICD-9-CM    1. History of colon polyps Z86.010 V12.72    2. Family history of colon cancer Z80.0 V16.0        Plan    1. Will plan for a colonoscopy with MAC anesthesia. The patient was informed of the risks and benefits of the procedure. 2.  The patient was provided with information on colon polyps and high fiber diet. 3.  Will obtain old colonoscopy records from [facility]. 4.  Further recommendations for follow-up will be determined after the colonoscopy has been completed.

## 2018-07-16 NOTE — PATIENT INSTRUCTIONS
Patient Education        Learning About Colonoscopy  What is a colonoscopy? A colonoscopy is a test (also called a procedure) that lets a doctor look inside your large intestine. The doctor uses a thin, lighted tube called a colonoscope. The doctor uses it to look for small growths called polyps, colon or rectal cancer (colorectal cancer), or other problems like bleeding. During the procedure, the doctor can take samples of tissue. The samples can then be checked for cancer or other conditions. The doctor can also take out polyps. How is colonoscopy done? This procedure is done in a doctor's office or a clinic or hospital. You will get medicine to help you relax and not feel pain. Some people find that they do not remember having the test because of the medicine. The doctor gently moves the colonoscope, or scope, through the colon. The scope is also a small video camera. It lets the doctor see the colon and take pictures. A colonoscopy usually takes 30 to 45 minutes. It may take longer if the doctor has to remove polyps. How do you prepare for the procedure? You need to clean out your colon before the procedure so the doctor can see all of your colon. You may start the cleaning process a day or two before the test. This depends on which \"colon prep\" your doctor recommends. To clean your colon, you stop eating solid foods and drink only clear liquids. You can have water, tea, coffee, clear juices, clear broths, flavored ice pops, and gelatin (such as Jell-O). Do not drink anything red or purple, such as grape juice or fruit punch. And do not eat red or purple foods, such as grape ice pops or cherry gelatin. The day or night before the procedure, you drink a large amount of a special liquid. This causes loose, frequent stools. You will go to the bathroom a lot. It is very important to drink all of the colon prep liquid. If you have problems drinking the liquid, call your doctor.   For many people, the has an increased risk for colon cancer. Polyps are usually found through routine colon cancer screening tests. Although most colon polyps are not cancerous, they are usually removed and then tested for cancer. Screening for colon cancer saves lives because the cancer can usually be cured if it is caught early. If you have a polyp that is the type that can turn into cancer, you may need more tests to examine your entire colon. The doctor will remove any other polyps that he or she finds, and you will be tested more often. Follow-up care is a key part of your treatment and safety. Be sure to make and go to all appointments, and call your doctor if you are having problems. It's also a good idea to know your test results and keep a list of the medicines you take. How can you care for yourself at home? Regular exams to look for colon polyps are the best way to prevent polyps from turning into colon cancer. These can include stool tests, sigmoidoscopy, colonoscopy, and CT colonography. Talk with your doctor about a testing schedule that is right for you. To prevent polyps  There is no home treatment that can prevent colon polyps. But these steps may help lower your risk for cancer. · Stay active. Being active can help you get to and stay at a healthy weight. Try to exercise on most days of the week. Walking is a good choice. · Eat well. Choose a variety of vegetables, fruits, legumes (such as peas and beans), fish, poultry, and whole grains. · Do not smoke. If you need help quitting, talk to your doctor about stop-smoking programs and medicines. These can increase your chances of quitting for good. · If you drink alcohol, limit how much you drink. Limit alcohol to 2 drinks a day for men and 1 drink a day for women. When should you call for help?   Call your doctor now or seek immediate medical care if:    · You have severe belly pain.     · Your stools are maroon or very bloody.    Watch closely for changes in squash. ¨ Starchy vegetables. These include potatoes with skins, kidney beans, and lima beans. · Take a fiber supplement every day if your doctor recommends it. Examples are Benefiber, Citrucel, FiberCon, and Metamucil. Ask your doctor how much to take. · Drink plenty of fluids, enough so that your urine is light yellow or clear like water. If you have kidney, heart, or liver disease and have to limit fluids, talk with your doctor before you increase the amount of fluids you drink. · Get some exercise every day. Exercise helps stool move through the colon. It also helps prevent constipation. · Keep a food diary. Try to notice and write down what foods cause gas, pain, or other symptoms. Then you can avoid these foods. Where can you learn more? Go to https://Inovus Solargriseldaeb.Dealstreet. org and sign in to your [employer] account. Enter E497 in the Ensenda box to learn more about \"High-Fiber Diet: Care Instructions. \"     If you do not have an account, please click on the \"Sign Up Now\" link. Current as of: May 12, 2017  Content Version: 11.6  © 8179-8006 [employer]. Care instructions adapted under license by [facility] ([facility]). If you have questions about a medical condition or this instruction, always ask your healthcare professional. [address] any warranty or liability for your use of this information.

## 2018-07-18 ENCOUNTER — HOSPITAL ENCOUNTER (OUTPATIENT)
Dept: NEUROLOGY | Age: 83
Discharge: HOME | End: 2018-07-18
Attending: INTERNAL MEDICINE | Admitting: INTERNAL MEDICINE

## 2018-07-26 ENCOUNTER — TELEPHONE (OUTPATIENT)
Dept: INTERNAL MEDICINE CLINIC | Age: 83
End: 2018-07-26

## 2018-07-26 DIAGNOSIS — G56.01 CARPAL TUNNEL SYNDROME OF RIGHT WRIST: Primary | ICD-10-CM

## 2018-07-26 NOTE — TELEPHONE ENCOUNTER
Please inform the patient that she has moderate to severe right-sided carpal tunnel syndrome which is causing numbness and tingling in her hand. I will refer her to orthopedic surgeon. She either needs steroid injection or release surgery.

## 2018-08-07 ENCOUNTER — OFFICE VISIT (OUTPATIENT)
Dept: INTERNAL MEDICINE CLINIC | Age: 83
End: 2018-08-07

## 2018-08-07 VITALS
HEIGHT: 62 IN | HEART RATE: 65 BPM | RESPIRATION RATE: 14 BRPM | BODY MASS INDEX: 36.99 KG/M2 | SYSTOLIC BLOOD PRESSURE: 122 MMHG | OXYGEN SATURATION: 96 % | DIASTOLIC BLOOD PRESSURE: 70 MMHG | WEIGHT: 201 LBS

## 2018-08-07 DIAGNOSIS — E73.9 LACTOSE INTOLERANCE: Primary | ICD-10-CM

## 2018-08-07 PROCEDURE — 1123F ACP DISCUSS/DSCN MKR DOCD: CPT | Performed by: INTERNAL MEDICINE

## 2018-08-07 PROCEDURE — 4040F PNEUMOC VAC/ADMIN/RCVD: CPT | Performed by: INTERNAL MEDICINE

## 2018-08-07 PROCEDURE — 1101F PT FALLS ASSESS-DOCD LE1/YR: CPT | Performed by: INTERNAL MEDICINE

## 2018-08-07 PROCEDURE — 1090F PRES/ABSN URINE INCON ASSESS: CPT | Performed by: INTERNAL MEDICINE

## 2018-08-07 PROCEDURE — G8400 PT W/DXA NO RESULTS DOC: HCPCS | Performed by: INTERNAL MEDICINE

## 2018-08-07 PROCEDURE — G8417 CALC BMI ABV UP PARAM F/U: HCPCS | Performed by: INTERNAL MEDICINE

## 2018-08-07 PROCEDURE — 99214 OFFICE O/P EST MOD 30 MIN: CPT | Performed by: INTERNAL MEDICINE

## 2018-08-07 PROCEDURE — G8427 DOCREV CUR MEDS BY ELIG CLIN: HCPCS | Performed by: INTERNAL MEDICINE

## 2018-08-07 PROCEDURE — 1036F TOBACCO NON-USER: CPT | Performed by: INTERNAL MEDICINE

## 2018-08-07 RX ORDER — DICYCLOMINE HYDROCHLORIDE 10 MG/1
10 CAPSULE ORAL 2 TIMES DAILY PRN
Qty: 30 CAPSULE | Refills: 3 | Status: SHIPPED | OUTPATIENT
Start: 2018-08-07 | End: 2018-09-06

## 2018-08-07 RX ORDER — SIMETHICONE 80 MG
80 TABLET,CHEWABLE ORAL 4 TIMES DAILY PRN
Qty: 180 TABLET | Refills: 3 | Status: SHIPPED | OUTPATIENT
Start: 2018-08-07 | End: 2018-09-06

## 2018-08-07 NOTE — PROGRESS NOTES
Kota Zamorano MD   magnesium 200 MG TABS tablet TAKE 1 TABLET BY MOUTH EVERY DAY 7/11/18  Yes Kota Zamorano MD   metFORMIN (GLUCOPHAGE) 500 MG tablet Take 500 mg by mouth daily (with breakfast)   Yes Historical Provider, MD   amLODIPine (NORVASC) 5 MG tablet TAKE 1 TABLET BY MOUTH at night every day and in morning if SBP > 150 2/6/18  Yes Addis Dial MD   fluvastatin (LESCOL XL) 80 MG extended release tablet Take 1 tablet by mouth daily 2/6/18  Yes Addis Dial MD   levothyroxine (SYNTHROID) 125 MCG tablet Take 1 tablet by mouth daily 2/6/18  Yes Addis Dial MD   metoprolol tartrate (LOPRESSOR) 25 MG tablet Take 1 tablet by mouth 2 times daily 2/6/18  Yes Addis Dial MD   ranitidine (ZANTAC) 150 MG tablet Take 1 tablet by mouth 2 times daily 2/6/18  Yes Addis Dial MD   rOPINIRole (REQUIP) 0.5 MG tablet Take 2 tablets by mouth nightly 2/6/18  Yes Addis Dial MD   docusate sodium (COLACE) 100 MG capsule Take 100 mg by mouth nightly    Yes Historical Provider, MD   L-Arginine 500 MG CAPS Take 500 mg by mouth daily   Yes Historical Provider, MD   Multiple Vitamins-Minerals (MULTIVITAMIN ADULT PO) Take by mouth daily   Yes Historical Provider, MD   Calcium Carb-Cholecalciferol (CALCIUM 600+D3 PO) Take 1 tablet by mouth 2 times daily    Yes Historical Provider, MD   aspirin 81 MG tablet Take 81 mg by mouth nightly   Yes Historical Provider, MD   Cholecalciferol (VITAMIN D-3 PO) Take 1 capsule by mouth nightly   Yes Historical Provider, MD   losartan (COZAAR) 50 MG tablet TAKE 1 TABLET TWICE DAILY 8/9/18   Addis Dial MD   Omega-3 Fatty Acids (FISH OIL) 1000 MG CAPS Take 1 capsule by mouth 2 times daily  Patient taking differently: Take 1,000 mg by mouth  3/3/17 6/11/18  Addis Dial MD       LAB DATA: Reviewed. REVIEW OF SYSTEMS:      CONSTITUTIONAL: Denies any fever, chill, fatigue, weight changes.     RESPIRATORY: Denies any cough, chest congestion, wheezing or shortness of breath. CARDIOVASCULAR: The patient denies chest pain, palpitations. GI: see HPI    ENDO:  Denies any heat or cold intolerance. Denies excessive thirst or urination. MSK: See HPI    PHYSICAL EXAMINATION:     /70 (Site: Right Arm, Position: Sitting, Cuff Size: Medium Adult)   Pulse 65   Resp 14   Ht 5' 2\" (1.575 m)   Wt 201 lb (91.2 kg)   SpO2 96%   BMI 36.76 kg/m²      GENERAL APPEARANCE:    Alert, oriented x 3, well developed, cooperative, not in any distress, appears stated age. HEAD:   Normocephalic, atraumatic   EYES:   Pupils are Equal and round, EOMI, lids normal, conjunctivea clear, sclera anicteric. NECK:    Supple, symmetrical,  trachea midline, no thyromegaly, no JVD, no lymphadenopathy. LUNGS:    Clear to auscultation bilaterally, respirations unlabored, accessory muscles are not used. HEART:     Regular rate and rhythm, S1 and S2 normal, no murmur, rub or gallop. PMI in MCL. ABDOMEN:    Soft, non-tender, bowel sounds are normoactive, no masses, no hepatospleenomegaly. EXTREMITY:   no cyanosis, clubbing. No bipedal edema. + varicose veins. NEURO:  Alert, oriented to person, place and time. Grossly intact. Gait steady. PSYCH:  Mood euthymic, insight and judgement good, no SI or HI. Nail: Right big toenail has 2 mL black spot in the middle. No involvement of the nail bed is noted. ASSESSMENT/PLAN:        ICD-10-CM ICD-9-CM    1. Lactose intolerance E73.9 271.3         Abdominal cramps: ?  Lactose intolerance. Start Bentyl and simethicone as needed. If symptoms worsen or don't improve as expected, it advised to call me. Black spot on right big toenail: Likely benign. May be from a splinter. Advised to see her dermatologist Since she is concerned. Right Carpal tunnel syndrome: Has appointment with Dr. Yesica Hassan. Intermittent palpitation: Taking Lopressor. Advised f/u with cardiologist.    HTN: Stable; continue current management.   Low salt diet. Monitor blood pressure as advised. Regular exercise as tolerated. Care discussed with patient. Primary prevention of coronary artery disease and stroke, by aggressive risk modification is counseled extensively, including regular exercise, weight management, and dietary restrictions were discussed in detail. Patient verbalized understanding and will follow through. CKD stage III  :  Stable. Advised to avoid NSAIDS, keep well hydrated. F/u with nephrologist.    HLD:    Stable; continue on Fluvastatin 80 mg daily and fish oil. Obesity: advised to lose weight. Hypothyroidism: stable. Continue Synthroid Current dose. Monitor thyroid function periodically    RLS: stable on requip and magnesium supplement. GERD: Stable on Zantac with occasional use of Protonix. Discussed dietary restrictions and lifestyle modification. Patient is aware of the effects of long-term use of PPI    Colon polyp:  Colonoscopy has been scheduled. Benign breast cysts: f/u advised in March 2019. Health Maintenance Due   Topic Date Due    Shingles Vaccine (1 of 2 - 2 Dose Series) 11/12/2014    TSH testing  07/18/2018     Care discussed with patient. Questions answered and patient verbalizes understanding and agrees with plan. Medications reviewed and reconciled. Continue current medications. Risks and benefits of meds are discussed. After visit summary provided. Advised to call for any problems, questions, or concerns. Follow up as directed, sooner if needed. Return in about 3 months (around 11/7/2018), or if symptoms worsen or fail to improve. This dictation was performed with a verbal recognition program and it was checked for errors. It is possible that there are still dictated errors within this office note. Any errors should be brought immediately to my attention for correction. All efforts were made to ensure that this office note is accurate.      Addis Dial MD

## 2018-08-09 RX ORDER — LOSARTAN POTASSIUM 50 MG/1
TABLET ORAL
Qty: 180 TABLET | Refills: 1 | Status: SHIPPED | OUTPATIENT
Start: 2018-08-09 | End: 2018-12-26 | Stop reason: SDUPTHER

## 2018-08-09 NOTE — TELEPHONE ENCOUNTER
Patient was just seen in the office last Tuesday and this medication was last filled in 02/28/2018 with 1 refill.  Please advise

## 2018-08-10 ENCOUNTER — OFFICE VISIT (OUTPATIENT)
Dept: ORTHOPEDIC SURGERY | Age: 83
End: 2018-08-10

## 2018-08-10 VITALS
HEART RATE: 76 BPM | HEIGHT: 62 IN | BODY MASS INDEX: 36.44 KG/M2 | OXYGEN SATURATION: 98 % | RESPIRATION RATE: 16 BRPM | WEIGHT: 198 LBS

## 2018-08-10 DIAGNOSIS — G56.01 RIGHT CARPAL TUNNEL SYNDROME: Primary | ICD-10-CM

## 2018-08-10 DIAGNOSIS — R52 PAIN: ICD-10-CM

## 2018-08-10 PROCEDURE — 1090F PRES/ABSN URINE INCON ASSESS: CPT | Performed by: PHYSICIAN ASSISTANT

## 2018-08-10 PROCEDURE — G8417 CALC BMI ABV UP PARAM F/U: HCPCS | Performed by: PHYSICIAN ASSISTANT

## 2018-08-10 PROCEDURE — 1101F PT FALLS ASSESS-DOCD LE1/YR: CPT | Performed by: PHYSICIAN ASSISTANT

## 2018-08-10 PROCEDURE — G8427 DOCREV CUR MEDS BY ELIG CLIN: HCPCS | Performed by: PHYSICIAN ASSISTANT

## 2018-08-10 PROCEDURE — 99203 OFFICE O/P NEW LOW 30 MIN: CPT | Performed by: PHYSICIAN ASSISTANT

## 2018-08-10 ASSESSMENT — ENCOUNTER SYMPTOMS
GASTROINTESTINAL NEGATIVE: 1
RESPIRATORY NEGATIVE: 1
EYES NEGATIVE: 1

## 2018-08-10 NOTE — Clinical Note
Patient scheduled with Dr. Janie Burrwos on 8/16/2018 to discuss  right carpal tunnel surgery.   Thank you for the referral.

## 2018-08-10 NOTE — PROGRESS NOTES
Scribe Authentication Statement  Felice Brink, scribed portions of this documentation for and in the presence of Julián Mcgrath PA-C on 8/10/18 at 11:00 AM.    Provider Authentication Statement  I, Julián Mcgrath PA-C, personally performed the services described in this documentation and they were scribed in my presence by the above listed scribe. The documentation is both accurate and complete. ORTHOPEDIC EVALUATION - Carpal Tunnel Syndrome    History of Present Illness (HPI):   Patient's PCP is listed as:  Marshall Pepe MD.    This is a:  [x]Consult Visit     []New Patient Visit     []Established Patient Visit    Neela Ross is a 81y.o. year old female evaluated at the request of Marshall Pepe MD regarding   Chief Complaint   Patient presents with    Wrist Pain     right       Setting when problem first occurred: home,   Mechanism: unknown   Location where symptom is most intense:  hand, radial   Quality:  numbness, tingling   Severity:     moderate and intermittent   Onset:  \"years ago\" 20+   Timing: gradually worsening   Radiates: does not radiate   Aggravating factors: worse at night or first thing in the morning and knitting   Relieving factors: Wrist brace   Associated manifestations: weakness and loss of sensation   Previous tests and diagnostic procedures: EMG (see below for result details)   Previous treatment: none   Effect on patient's life: difficulty manipulating small objects   Predisposing Factors: diabetes mellitus, hypothyroidism       Review of Systems (ROS):   Problem = 1 , Extended = 2-9 , Complete = 10  Unless otherwise specified in this note, the R.O.S. is reviewed today with the patient and is as below-      Review of Systems   Constitutional: Negative. HENT: Negative. Eyes: Negative. Respiratory: Negative. Cardiovascular: Negative. Gastrointestinal: Negative. Genitourinary: Negative. Musculoskeletal: Negative. Skin: Negative. Neurological: Negative. Never Used    Alcohol use No    Drug use: No    Sexual activity: Not Asked     Other Topics Concern    None     Social History Narrative    None         ORTHOPEDIC CONSTITUTION EXAM:     Vital Signs:  Pulse 76   Resp 16   Ht 5' 2\" (1.575 m)   Wt 198 lb (89.8 kg)   SpO2 98%   BMI 36.21 kg/m²     Constitution:  Generally, the patient is [x] alert, [x] appears stated age, and [x] in no distress. General body habitus is:   []Cachectic  []Thin  []Normal  []Overweight  []Obese  []Morbidly Obese     Psychiatric:   Judgement and insight is:  [x]Good    []Poor  Oriented to:  [x]person, [x]place, [x]time. Mood for circumstances is:  [x]Appropriate   []Inappropriate    Gait and Station:   Gait is:  [x]Normal  []Antalgic   []Trendelenburg   []Wide   []Unsteady   []Other:  Assistive Device:  []Cane    []Crutches    []Walker    []Wheelchair    []Gurney  Weight bearing on injured extremity:  [x]Is able   []Is not able    ORTHOPEDIC WRIST EXAM:     WRIST EXAM:  [x]Right []Left  The patient is:  [x]Right Handed    []Left Handed  Circulation:  [x] The limb is warm and well perfused. [x] Capillary refill is intact. [] Edema:     Inspection:  [x] Skin intact without abrasion or lacerations.   [x] Normal wrist alignment:  [] Abnormal alignment:  [] Ecchymosis:  [] Abrasion:  [] Laceration:   [] Scar / Surgical incision:  [] Orthopedic appliance:     Range of Motion:  [x] Normal Wrist AROM     [x] Normal Wrist PROM  [x]Can make a fist    [x]Thumb tip can touch pinky tip  [] Deferred due to fracture  Active Wrist Flexion: full []AROM = PROM   Active Wrist Extension: full []AROM = PROM   Active Radial Deviation: full []AROM = PROM   Active Ulnar Deviation: full []AROM = PROM   Active Wrist Supination full []AROM = PROM   Active Wrist Pronation full []AROM = PROM   Passive Wrist Flexion:  []AROM = PROM   Passive Wrist Extension:  []AROM = PROM   Passive Radial Deviation:  []AROM = PROM   Passive Ulnar Deviation:  []AROM = PROM Passive Wrist Flexion:  []AROM = PROM   Passive Wrist Extension:  []AROM = PROM     Motor Function:  [x] No gross motor weakness of wrist [x] No gross motor weakness of hand  [x]Thumbs Up    [x]Pointer finger    [x]OK sign    [x]Cross fingers    [x]Number 5  [] Motor weakness:     Neurologic:  [] Sensation to light touch intact. [x] Impaired:  [x] Decreased sensation to light touch over median nerve distribution. [] Decreased sensation to light touch over ulnar nerve distribution. [x] Deep tendon reflexes intact. [] Impaired:  [x] Coordination / proprioception intact. [] Impaired:     Palpation: (Not tested if not marked)     Normal Tenderness Swelling Crepitation Calor   Circumferential Wrist: [x] [] [] [] []   Distal Radius: [x] [] [] [] []   Jaime's Tubercle: [x] [] [] [] []   Radial Styloid: [x] [] [] [] []   First Dorsal Compartment: [x] [] [] [] []   Distal Ulna: [x] [] [] [] []   Ulna Styloid: [x] [] [] [] []   TFCC: [x] [] [] [] []   DRUJ: [x] [] [] [] []   Snuff Box: [x] [] [] [] []   Carpal: [x] [] [] [] []   Metacarpal: [x] [] [] [] []   Other: [] [] [] [] []   Other: [] [] [] [] []     Comment:     Provocative Tests: (Not tested if not marked)   Negative Positive Positive Findings   DRUJ Piano Key Sign: [x] []    Tinel's Sign [x] []    Phalen's Sign: [] [x]    Carpal Compression: [] [x]    TFC Compression Test: [x] []    Thumb CMC Grind Test: [x] []    Finklestein's test: [] []    Froment's sign: [] []    Other: [] []          MEDICAL DATA:   Outside record review:  EMG  ADMIT DATE: 2018  PROVIDER:     Sina Rivers MD     DATE OF EM2018  IMPRESSION:  1. Moderate to marked right carpal tunnel syndrome with prolongation of  right median motor and sensory distal latencies and transcarpal sensory  latency. Mild neurogenic EMG changes are seen in the right thenar muscles.   2.  Left median sensory distal latency and transcarpal sensory latency are  at the upper limit of normal.  Left

## 2018-08-16 ENCOUNTER — OFFICE VISIT (OUTPATIENT)
Dept: ORTHOPEDIC SURGERY | Age: 83
End: 2018-08-16

## 2018-08-16 DIAGNOSIS — G56.01 CARPAL TUNNEL SYNDROME OF RIGHT WRIST: Primary | ICD-10-CM

## 2018-08-16 PROCEDURE — G8400 PT W/DXA NO RESULTS DOC: HCPCS | Performed by: ORTHOPAEDIC SURGERY

## 2018-08-16 PROCEDURE — 1123F ACP DISCUSS/DSCN MKR DOCD: CPT | Performed by: ORTHOPAEDIC SURGERY

## 2018-08-16 PROCEDURE — G8417 CALC BMI ABV UP PARAM F/U: HCPCS | Performed by: ORTHOPAEDIC SURGERY

## 2018-08-16 PROCEDURE — G8427 DOCREV CUR MEDS BY ELIG CLIN: HCPCS | Performed by: ORTHOPAEDIC SURGERY

## 2018-08-16 PROCEDURE — 1036F TOBACCO NON-USER: CPT | Performed by: ORTHOPAEDIC SURGERY

## 2018-08-16 PROCEDURE — 1090F PRES/ABSN URINE INCON ASSESS: CPT | Performed by: ORTHOPAEDIC SURGERY

## 2018-08-16 PROCEDURE — 99213 OFFICE O/P EST LOW 20 MIN: CPT | Performed by: ORTHOPAEDIC SURGERY

## 2018-08-16 PROCEDURE — 4040F PNEUMOC VAC/ADMIN/RCVD: CPT | Performed by: ORTHOPAEDIC SURGERY

## 2018-08-16 PROCEDURE — 1101F PT FALLS ASSESS-DOCD LE1/YR: CPT | Performed by: ORTHOPAEDIC SURGERY

## 2018-08-16 ASSESSMENT — ENCOUNTER SYMPTOMS
EYES NEGATIVE: 1
GASTROINTESTINAL NEGATIVE: 1
RESPIRATORY NEGATIVE: 1

## 2018-08-16 NOTE — PATIENT INSTRUCTIONS
Full PAT   Consent signed today  No meds list given   Stop Asprin 5 days prior  Post op meds day of surgery   Follow up for surgery as scheduled on September 12th

## 2018-08-16 NOTE — PROGRESS NOTES
Review of Systems   Constitutional: Negative. HENT: Negative. Eyes: Negative. Respiratory: Negative. Cardiovascular: Negative. Gastrointestinal: Negative. Genitourinary: Negative. Musculoskeletal: Negative. Skin: Negative. Neurological: Negative. Endo/Heme/Allergies: Negative. Psychiatric/Behavioral: Negative. HPI:  Earl Rod is a 81y.o. year old female who complains of Right hand pain for about 20 + years is not having much pain today she. She has been using her wrist brace at night and it seems to help. The Right hand pain assessment is:   Intensity: 2/10   Location:        Radial fingers of the hand   Description:    aching, shooting and tingling    The symptoms started 10+ years ago. Cause of injury was  No specific cause. Previous treatment for this episode has included none. Symptoms improve with the use of a brace/sleeve. The symptoms are worse with activity. The progression of symptoms, overall course: symptoms have progressed to a point and plateaued.  Prior to this episode, hand history is: negative for prior surgery, trauma, arthritis or disorders        Past Medical History:   Diagnosis Date    Abnormal ECG 10/5/2017    Acquired hypothyroidism 3/5/2017    Chest heaviness 10/5/2017    Chronic kidney disease     CKD (chronic kidney disease), stage III 3/5/2017    Essential hypertension 3/5/2017    Gastroesophageal reflux disease 3/5/2017    Heart palpitations 10/5/2017    History of nuclear stress test 10/12/2017    cardiolite-EF69%,small lateral wall ischemia    Holter monitor, abnormal 10/05/2017    Hx of Doppler echocardiogram 10/11/2017    EF50-60%,increased LVOT and AOV    Hx of echocardiogram 10/06/2016    EF54% Mild to mod MR, See media    Hyperlipidemia 3/5/2017    Hypothyroidism     Impaired fasting glucose 3/5/2017    Restless leg syndrome 3/5/2017       Past Surgical History:   Procedure Laterality Date    CATARACT REMOVAL Bilateral     CHOLECYSTECTOMY      COLONOSCOPY      EYE SURGERY      HYSTERECTOMY      KNEE SURGERY Bilateral        Family History   Problem Relation Age of Onset    Heart Disease Mother     Breast Cancer Mother     Heart Disease Father     Diabetes Father     High Blood Pressure Father     Cancer Father     Stroke Father     High Blood Pressure Paternal Grandmother        Social History     Social History    Marital status:      Spouse name: N/A    Number of children: N/A    Years of education: N/A     Social History Main Topics    Smoking status: Never Smoker    Smokeless tobacco: Never Used    Alcohol use No    Drug use: No    Sexual activity: Not on file     Other Topics Concern    Not on file     Social History Narrative    No narrative on file       Current Outpatient Prescriptions   Medication Sig Dispense Refill    losartan (COZAAR) 50 MG tablet TAKE 1 TABLET TWICE DAILY 180 tablet 1    dicyclomine (BENTYL) 10 MG capsule Take 1 capsule by mouth 2 times daily as needed (cramps) 30 capsule 3    simethicone (MYLICON) 80 MG chewable tablet Take 1 tablet by mouth 4 times daily as needed for Flatulence 180 tablet 3    bisacodyl (BISACODYL) 5 MG EC tablet Take 4 tablets once for colonoscopy prep 4 tablet 0    KLOR-CON M10 10 MEQ extended release tablet TAKE 1 TABLET EVERY DAY 30 tablet 3    magnesium 200 MG TABS tablet TAKE 1 TABLET BY MOUTH EVERY DAY 30 tablet 3    metFORMIN (GLUCOPHAGE) 500 MG tablet Take 500 mg by mouth daily (with breakfast)      amLODIPine (NORVASC) 5 MG tablet TAKE 1 TABLET BY MOUTH at night every day and in morning if SBP > 150 90 tablet 3    fluvastatin (LESCOL XL) 80 MG extended release tablet Take 1 tablet by mouth daily 90 tablet 1    levothyroxine (SYNTHROID) 125 MCG tablet Take 1 tablet by mouth daily 90 tablet 2    metoprolol tartrate (LOPRESSOR) 25 MG tablet Take 1 tablet by mouth 2 times daily 180 tablet 1    ranitidine (ZANTAC) 150 MG tablet Take 1 tablet by mouth 2 times daily 180 tablet 1    rOPINIRole (REQUIP) 0.5 MG tablet Take 2 tablets by mouth nightly 180 tablet 1    docusate sodium (COLACE) 100 MG capsule Take 100 mg by mouth nightly       L-Arginine 500 MG CAPS Take 500 mg by mouth daily      Multiple Vitamins-Minerals (MULTIVITAMIN ADULT PO) Take by mouth daily      Calcium Carb-Cholecalciferol (CALCIUM 600+D3 PO) Take 1 tablet by mouth 2 times daily       Omega-3 Fatty Acids (FISH OIL) 1000 MG CAPS Take 1 capsule by mouth 2 times daily (Patient taking differently: Take 1,000 mg by mouth ) 180 capsule 3    aspirin 81 MG tablet Take 81 mg by mouth nightly      Cholecalciferol (VITAMIN D-3 PO) Take 1 capsule by mouth nightly       No current facility-administered medications for this visit. Allergies   Allergen Reactions    Codeine Anaphylaxis    Lipitor [Atorvastatin] Anaphylaxis and Other (See Comments)    Penicillins     Phenobarbital     Vancomycin Other (See Comments)     Red Man Syndrome    Zocor [Simvastatin] Other (See Comments)     Muscle pain    Sulfa Antibiotics Nausea And Vomiting       Review of Systems:  See above      Physical Exam:   There were no vitals taken for this visit. Gait is Normal.   Gen/Psych: Examination reveals a pleasant individual in no acute distress. The patient is oriented to time, place and person. The patient's mood and affect are appropriate.     Lymph: The lymphatic examination bilaterally reveals all areas to be without enlargement or induration.      Skin intact without lymphadenopathy, discoloration, or abnormal temperature.      Vascular: There is intact, symmetric circulation in both upper extremities. right Arm exam:  Sensation is subjectively and objectively decreased in the extremity with tingling in the radial fingers  Muscular strength is clinically appropriate bilaterally.   hand exam:  -effusion is absent   -no tenderness to palpation  -ROM is full range of motion  -Positive carpal tunnel compression test          Imaging studies: reviewed     Narrative   EXAMINATION:   . XRAY VIEWS OF THE RIGHT WRIST       8/10/2018 11:10 am       Impression   Moderate degenerative changes as described above. DATE OF EM2018  IMPRESSION:  1.  Moderate to marked right carpal tunnel syndrome with prolongation of  right median motor and sensory distal latencies and transcarpal sensory  latency.  Mild neurogenic EMG changes are seen in the right thenar muscles. 2.  Left median sensory distal latency and transcarpal sensory latency are  at the upper limit of normal.  Left median motor distal latency is fair. This finding is consistent with very early left carpal tunnel syndrome. 3.  No other EMG changes are seen in the right arm muscles.  Nerve  conduction velocities are within normal limits. Impression:  right carpal tunnel syndrome      Plan:    I had an extensive discussion with Ms. Marcus Flores and any family members present regarding the natural history, etiology, and long term consequences of this problem. I have outlined a treatment plan with them and, in my opinion, surgical intervention is indicated at this time. I have discussed with them the potential complications, limitations, expectations, alternatives, and risks of Carpal Tunnel Release(s). They have had full opportunity to ask their questions. I have answered them all to their satisfaction. I feel that the patient and any present family members do understand our discussion today and they have provided informed consent for right carpal tunnel release(s).        Full PAT   Consent signed today  No meds list given   Stop Asprin 5 days prior  Post op meds day of surgery   Follow up for surgery as scheduled on

## 2018-08-17 ENCOUNTER — TELEPHONE (OUTPATIENT)
Dept: GASTROENTEROLOGY | Age: 83
End: 2018-08-17

## 2018-08-21 ENCOUNTER — HOSPITAL ENCOUNTER (OUTPATIENT)
Dept: SURGERY | Age: 83
Discharge: HOME | End: 2018-08-21
Attending: INTERNAL MEDICINE | Admitting: INTERNAL MEDICINE

## 2018-08-21 VITALS
WEIGHT: 205 LBS | SYSTOLIC BLOOD PRESSURE: 146 MMHG | RESPIRATION RATE: 16 BRPM | HEART RATE: 59 BPM | TEMPERATURE: 97.5 F | DIASTOLIC BLOOD PRESSURE: 80 MMHG | HEIGHT: 62 IN | OXYGEN SATURATION: 97 % | BODY MASS INDEX: 37.73 KG/M2

## 2018-08-21 DIAGNOSIS — K63.5 POLYP OF COLON, UNSPECIFIED PART OF COLON, UNSPECIFIED TYPE: ICD-10-CM

## 2018-08-21 PROCEDURE — 45385 COLONOSCOPY W/LESION REMOVAL: CPT | Performed by: INTERNAL MEDICINE

## 2018-08-21 PROCEDURE — 45380 COLONOSCOPY AND BIOPSY: CPT | Performed by: INTERNAL MEDICINE

## 2018-08-21 RX ORDER — SODIUM CHLORIDE, SODIUM LACTATE, POTASSIUM CHLORIDE, CALCIUM CHLORIDE 600; 310; 30; 20 MG/100ML; MG/100ML; MG/100ML; MG/100ML
INJECTION, SOLUTION INTRAVENOUS CONTINUOUS
Status: DISCONTINUED | OUTPATIENT
Start: 2018-08-21 | End: 2018-08-22 | Stop reason: HOSPADM

## 2018-08-21 RX ADMIN — SODIUM CHLORIDE, SODIUM LACTATE, POTASSIUM CHLORIDE, CALCIUM CHLORIDE: 600; 310; 30; 20 INJECTION, SOLUTION INTRAVENOUS at 11:07

## 2018-08-21 ASSESSMENT — PAIN SCALES - GENERAL
PAINLEVEL_OUTOF10: 0
PAINLEVEL_OUTOF10: 0

## 2018-08-21 ASSESSMENT — PAIN - FUNCTIONAL ASSESSMENT: PAIN_FUNCTIONAL_ASSESSMENT: 0-10

## 2018-08-21 NOTE — PROGRESS NOTES
BRIEF COLONOSCOPY REPORT:    Impression:    1) multiple polyps were removed           Suggest:   1) repeat it in 1 year     The complete operative report (including photos) is available in the following locations:   1) soft chart now   2) report will also be scanned and can then be found by going to \"chart review\" then \"notes\" then \"op report\" or by going to \"chart review\" then \"media\" then \"op report\". For review of photos, may need to go to page 2.

## 2018-08-21 NOTE — PROGRESS NOTES
1200 Patient transferred from GI lab to [facility] via cart, she is awake and denies needs at this time. Her  is at bedside. 1210 patient is sitting up drinking a diet pepsi. [phone] patient and her  given home instructions. [phone] Dr Wilber Murray in to talk to patient and her . 1249 Patient discharged to home, her  will drive her.

## 2018-08-21 NOTE — H&P
Reason for Visit: h/o polyps    HPI: Ms. The patient came here for a colonoscopy. There was no contraindication for the colonoscopy.          PMH:    Past Medical History:   Diagnosis Date    Abnormal ECG 10/5/2017    Acquired hypothyroidism 3/5/2017    Chest heaviness 10/5/2017    Chronic kidney disease     CKD (chronic kidney disease), stage III 3/5/2017    Essential hypertension 3/5/2017    Gastroesophageal reflux disease 3/5/2017    Heart palpitations 10/5/2017    History of nuclear stress test 10/12/2017    cardiolite-EF69%,small lateral wall ischemia    Holter monitor, abnormal 10/05/2017    Hx of Doppler echocardiogram 10/11/2017    EF50-60%,increased LVOT and AOV    Hx of echocardiogram 10/06/2016    EF54% Mild to mod MR, See media    Hyperlipidemia 3/5/2017    Hypothyroidism     Impaired fasting glucose 3/5/2017    PONV (postoperative nausea and vomiting)     Restless leg syndrome 3/5/2017       PSH:    Past Surgical History:   Procedure Laterality Date    CATARACT REMOVAL Bilateral     CHOLECYSTECTOMY      COLONOSCOPY  04/2014    HX: polyps    EYE SURGERY Bilateral 2011    cataracs with lens replacement    HYSTERECTOMY      KNEE SURGERY Bilateral 2007    joint replacement       Medications:    Current Outpatient Prescriptions   Medication Sig Dispense Refill    pantoprazole (PROTONIX) 40 MG injection Infuse 40 mg intravenously nightly      losartan (COZAAR) 50 MG tablet TAKE 1 TABLET TWICE DAILY 180 tablet 1    dicyclomine (BENTYL) 10 MG capsule Take 1 capsule by mouth 2 times daily as needed (cramps) 30 capsule 3    simethicone (MYLICON) 80 MG chewable tablet Take 1 tablet by mouth 4 times daily as needed for Flatulence 180 tablet 3    bisacodyl (BISACODYL) 5 MG EC tablet Take 4 tablets once for colonoscopy prep 4 tablet 0    magnesium 200 MG TABS tablet TAKE 1 TABLET BY MOUTH EVERY DAY (Patient taking differently: TAKE 1 TABLET BY MOUTH DAILY @ BEDTIME) 30 tablet 3    amLODIPine (NORVASC) 5 MG tablet TAKE 1 TABLET BY MOUTH at night every day and in morning if SBP > 150 (Patient taking differently: Take 1 tablet daily if SBP > 150) 90 tablet 3    levothyroxine (SYNTHROID) 125 MCG tablet Take 1 tablet by mouth daily 90 tablet 2    metoprolol tartrate (LOPRESSOR) 25 MG tablet Take 1 tablet by mouth 2 times daily 180 tablet 1    ranitidine (ZANTAC) 150 MG tablet Take 1 tablet by mouth 2 times daily (Patient taking differently: Take 150 mg by mouth every morning ) 180 tablet 1    rOPINIRole (REQUIP) 0.5 MG tablet Take 2 tablets by mouth nightly 180 tablet 1    docusate sodium (COLACE) 100 MG capsule Take 100 mg by mouth nightly       L-Arginine 500 MG CAPS Take 500 mg by mouth daily      Multiple Vitamins-Minerals (MULTIVITAMIN ADULT PO) Take by mouth daily      Calcium Carb-Cholecalciferol (CALCIUM 600+D3 PO) Take 1 tablet by mouth 2 times daily       aspirin 81 MG tablet Take 81 mg by mouth nightly      Cholecalciferol (VITAMIN D-3 PO) Take 1 capsule by mouth nightly      Omega-3 Fatty Acids (FISH OIL) 1000 MG CAPS Take 1 capsule by mouth 2 times daily (Patient taking differently: Take 1,000 mg by mouth daily ) 180 capsule 3     Current Facility-Administered Medications   Medication Dose Route Frequency Provider Last Rate Last Dose    lactated ringers infusion   Intravenous Continuous Edwin Jennings MD           Allergies: Allergies   Allergen Reactions    Codeine Anaphylaxis    Lipitor [Atorvastatin] Anaphylaxis and Other (See Comments)    Penicillins     Phenobarbital     Vancomycin Other (See Comments)     Red Man Syndrome    Zocor [Simvastatin] Other (See Comments)     Muscle pain    Sulfa Antibiotics Nausea And Vomiting       Social History:    Social History     Social History    Marital status:      Spouse name: N/A    Number of children: N/A    Years of education: N/A     Occupational History    Not on file.      Social History Main Topics    Smoking status: Never Smoker    Smokeless tobacco: Never Used    Alcohol use No    Drug use: No    Sexual activity: Not on file     Other Topics Concern    Not on file     Social History Narrative    No narrative on file       Family History:    Family History   Problem Relation Age of Onset    Heart Disease Mother     Breast Cancer Mother     Heart Disease Father     Diabetes Father     High Blood Pressure Father     Cancer Father     Stroke Father     High Blood Pressure Paternal Grandmother          Review of Systems:  Constitutional: No weight loss, no fevers. Eyes: No problems with vision. ENT: No nose or sinus problems, no oral problems, no throat problems or hoarseness. Cardiovascular: No chest pain, no leg pain with walking, no palpitations, no ankle swelling. Respiratory: No shortness of breath, no persistent cough, no wheezing. Endocrine: No increased thirst, no increased urination. Gastrointestinal: No heartburn, no dysphagia, no abdominal pain, no loss of appetite, no nausea or vomiting, no diarrhea, no constipation, no melena, no hematochezia, no sceleral icterus or jaundice. Skin: No rashes. Musculoskeletal: No trouble walking or standing, no joint pain, no muscle pain. Allergy/Immune System: No allergies, no frequent infections. Neurological: No memory difficulties, no temporary blindness, no difficulty speaking, no headaches, no numbness. Psychiatric: No depression, no suicidal ideation, no auditory hallucinations. Hematological/Lymphatic: No lymphadenopathy, no frequent nose bleeds, no easy bruising. Genitourinary: No penile/vaginal discharge, no pain with urination, no trouble starting urinary stream, no hematuria. Physical Examination  Vital Signs: BP (!) 174/93   Pulse 60   Temp 97 °F (36.1 °C) (Temporal)   Resp 16   Ht 5' 2\" (1.575 m)   Wt 205 lb (93 kg)   SpO2 96%   BMI 37.49 kg/m²  Body mass index is 37.49 kg/m².     General: The patient is a 81 y.o. female

## 2018-08-21 NOTE — ANESTHESIA PRE-OP
Department of Anesthesiology  Preprocedure Note       Name:  Renetta Cueto   Age:  81 y.o.  :  1935                                          MRN:  [id number]         Date:  2018      Surgeon:    Procedure:    Medications prior to admission:   Prior to Admission medications    Medication Sig Start Date End Date Taking?  Authorizing Provider   pantoprazole (PROTONIX) 40 MG injection Infuse 40 mg intravenously nightly    Historical Provider, MD   losartan (COZAAR) 50 MG tablet TAKE 1 TABLET TWICE DAILY 18   Chrissie Armstrong MD   dicyclomine (BENTYL) 10 MG capsule Take 1 capsule by mouth 2 times daily as needed (cramps) 18   Chrissie Armstrong MD   simethicone (MYLICON) 80 MG chewable tablet Take 1 tablet by mouth 4 times daily as needed for Flatulence 18   Chrissie Armstrong MD   bisacodyl (BISACODYL) 5 MG EC tablet Take 4 tablets once for colonoscopy prep 18   HARDIK Nevarez CNP   magnesium 200 MG TABS tablet TAKE 1 TABLET BY MOUTH EVERY DAY  Patient taking differently: TAKE 1 TABLET BY MOUTH DAILY @ BEDTIME 18   Fay Silva MD   amLODIPine (NORVASC) 5 MG tablet TAKE 1 TABLET BY MOUTH at night every day and in morning if SBP > 150  Patient taking differently: Take 1 tablet daily if SBP > 150 18   Chrissie Armstrong MD   levothyroxine (SYNTHROID) 125 MCG tablet Take 1 tablet by mouth daily 18   Chrissie Armstrong MD   metoprolol tartrate (LOPRESSOR) 25 MG tablet Take 1 tablet by mouth 2 times daily 18   Chrissie Armstrong MD   ranitidine (ZANTAC) 150 MG tablet Take 1 tablet by mouth 2 times daily  Patient taking differently: Take 150 mg by mouth every morning  18   Chrissie Armstrong MD   rOPINIRole (REQUIP) 0.5 MG tablet Take 2 tablets by mouth nightly 18   Chrissie Armstrong MD   docusate sodium (COLACE) 100 MG capsule Take 100 mg by mouth nightly     Historical Provider, MD   L-Arginine 500 MG CAPS Take 500 mg by mouth daily    Historical Provider, MD by mouth nightly       L-Arginine 500 MG CAPS Take 500 mg by mouth daily      Multiple Vitamins-Minerals (MULTIVITAMIN ADULT PO) Take by mouth daily      Calcium Carb-Cholecalciferol (CALCIUM 600+D3 PO) Take 1 tablet by mouth 2 times daily       Omega-3 Fatty Acids (FISH OIL) 1000 MG CAPS Take 1 capsule by mouth 2 times daily (Patient taking differently: Take 1,000 mg by mouth daily ) 180 capsule 3    aspirin 81 MG tablet Take 81 mg by mouth nightly      Cholecalciferol (VITAMIN D-3 PO) Take 1 capsule by mouth nightly       No current facility-administered medications for this encounter. Allergies:     Allergies   Allergen Reactions    Codeine Anaphylaxis    Lipitor [Atorvastatin] Anaphylaxis and Other (See Comments)    Penicillins     Phenobarbital     Vancomycin Other (See Comments)     Red Man Syndrome    Zocor [Simvastatin] Other (See Comments)     Muscle pain    Sulfa Antibiotics Nausea And Vomiting       Problem List:    Patient Active Problem List   Diagnosis Code    Impaired fasting glucose R73.01    Gastroesophageal reflux disease K21.9    Acquired hypothyroidism E03.9    Essential hypertension I10    Restless leg syndrome G25.81    Chronic kidney disease (CKD) stage G3a/A1, moderately decreased glomerular filtration rate (GFR) between 45-59 mL/min/1.73 square meter and albuminuria creatinine ratio less than 30 mg/g N18.3    Hyperlipidemia E78.5    DM (diabetes mellitus) ([facility] [address] E11.9    Heart palpitations R00.2    Chest heaviness R07.89    Abnormal ECG R94.31    VHD (valvular heart disease) I38       Past Medical History:        Diagnosis Date    Abnormal ECG 10/5/2017    Acquired hypothyroidism 3/5/2017    Chest heaviness 10/5/2017    Chronic kidney disease     CKD (chronic kidney disease), stage III 3/5/2017    Essential hypertension 3/5/2017    Gastroesophageal reflux disease 3/5/2017    Heart palpitations 10/5/2017    History of nuclear stress test 10/12/2017 cardiolite-EF69%,small lateral wall ischemia    Holter monitor, abnormal 10/05/2017    Hx of Doppler echocardiogram 10/11/2017    EF50-60%,increased LVOT and AOV    Hx of echocardiogram 10/06/2016    EF54% Mild to mod MR, See media    Hyperlipidemia 3/5/2017    Hypothyroidism     Impaired fasting glucose 3/5/2017    PONV (postoperative nausea and vomiting)     Restless leg syndrome 3/5/2017       Past Surgical History:        Procedure Laterality Date    CATARACT REMOVAL Bilateral     CHOLECYSTECTOMY      COLONOSCOPY  04/2014    HX: polyps    EYE SURGERY Bilateral 2011    cataracs with lens replacement    HYSTERECTOMY      KNEE SURGERY Bilateral 2007    joint replacement       Social History:    Social History   Substance Use Topics    Smoking status: Never Smoker    Smokeless tobacco: Never Used    Alcohol use No                                Counseling given: Not Answered      Vital Signs (Current): There were no vitals filed for this visit. BP Readings from Last 3 Encounters:   08/07/18 122/70   07/16/18 (!) 166/98   06/11/18 120/70       NPO Status:                                                                                 BMI:   Wt Readings from Last 3 Encounters:   08/17/18 198 lb (89.8 kg)   08/10/18 198 lb (89.8 kg)   08/07/18 201 lb (91.2 kg)     There is no height or weight on file to calculate BMI. Anesthesia Evaluation  Patient summary reviewed   history of anesthetic complications: PONV. Airway: Mallampati: III  TM distance: >3 FB   Neck ROM: limited  Mouth opening: > = 3 FB Dental: normal exam         Pulmonary:normal exam                               Cardiovascular:    (+) hypertension:,          Beta Blocker:  Order written      ROS comment: 2017 echo    Summary   Left ventricular systolic function is normal with an ejection fraction of   50-60%.    Mild concentric left ventricular hypertrophy with Grade I diastolic   dysfunction.   Increased LVOT and AoV velocities with increased mean gradient of 13 mmHg.   Aortic valve is opening normally without signs of sclerosis.   No evidence of pericardial effusion. Neuro/Psych:               GI/Hepatic/Renal:   (+) GERD:, bowel prep, morbid obesity          Endo/Other:    (+) Diabetes, hypothyroidism::., .                 Abdominal:           Vascular:                                      Anesthesia Plan      MAC     ASA 3             Anesthetic plan and risks discussed with patient.                       HARDIK Wong - CRNA   8/21/2018

## 2018-08-28 DIAGNOSIS — R73.01 IMPAIRED FASTING GLUCOSE: ICD-10-CM

## 2018-08-28 PROBLEM — I10 HTN (HYPERTENSION): Status: ACTIVE | Noted: 2018-08-28

## 2018-08-29 LAB
ESTIMATED AVERAGE GLUCOSE: 122.6 MG/DL
HBA1C MFR BLD: 5.9 %

## 2018-09-05 NOTE — ANESTHESIA PRE-OP
Pre-Admission Testing   Pre-Procedural Screening   NP Note    PMH:  Past Medical History:   Diagnosis Date    Abnormal ECG 10/5/2017    Acquired hypothyroidism 3/5/2017    Chest heaviness 10/5/2017    Chronic kidney disease     CKD (chronic kidney disease), stage III 3/5/2017    Essential hypertension 3/5/2017    Gastroesophageal reflux disease 3/5/2017    Heart palpitations 10/5/2017    History of nuclear stress test 10/12/2017    cardiolite-EF69%,small lateral wall ischemia    Holter monitor, abnormal 10/05/2017    Hx of Doppler echocardiogram 10/11/2017    EF50-60%,increased LVOT and AOV    Hx of echocardiogram 10/06/2016    EF54% Mild to mod MR, See media    Hyperlipidemia 3/5/2017    Hypothyroidism     Impaired fasting glucose 3/5/2017    PONV (postoperative nausea and vomiting)     Restless leg syndrome 3/5/2017       PSH:    Past Surgical History:   Procedure Laterality Date    CATARACT REMOVAL Bilateral     CHOLECYSTECTOMY      COLONOSCOPY  04/2014    HX: polyps    COLONOSCOPY  08/21/2018    internal grade 1 hemorrhoids, severe diverticulosis found in sigmoid colon and descending colon, three 4mm and 2mm sessile polyps found in rectum, two 3mm polyps found in mid ascending colon, two 7mm sessile polyps in descending colon, single 2 mm polyp in splenic flexure, 4 mm polyp found in colon    EYE SURGERY Bilateral 2011    cataracs with lens replacement    HYSTERECTOMY      KNEE SURGERY Bilateral 2007    joint replacement        Current Medications:   Not in a hospital admission. Allergies:    Allergies   Allergen Reactions    Codeine Anaphylaxis    Lipitor [Atorvastatin] Anaphylaxis and Other (See Comments)    Penicillins     Phenobarbital     Vancomycin Other (See Comments)     Red Man Syndrome    Zocor [Simvastatin] Other (See Comments)     Muscle pain    Sulfa Antibiotics Nausea And Vomiting             Social History:  Social History     Social History    Marital status:      Spouse name: N/A    Number of children: N/A    Years of education: N/A     Occupational History    Not on file. Social History Main Topics    Smoking status: Never Smoker    Smokeless tobacco: Never Used    Alcohol use No    Drug use: No    Sexual activity: Not on file     Other Topics Concern    Not on file     Social History Narrative    No narrative on file        Recent Vitals:  Vitals:    09/06/18 0840   BP: 139/65   Pulse: 58   Resp: 16   Temp: 97.6 °F (36.4 °C)   SpO2: 96%     Wt Readings from Last 3 Encounters:   08/28/18 205 lb (93 kg)   08/21/18 205 lb (93 kg)   08/17/18 198 lb (89.8 kg)      Ht Readings from Last 3 Encounters:   08/28/18 5' 2\" (1.575 m)   08/21/18 5' 2\" (1.575 m)   08/17/18 5' 2\" (1.575 m)     There is no height or weight on file to calculate BMI. Wt Readings from Last 3 Encounters:   08/28/18 205 lb (93 kg)   08/21/18 205 lb (93 kg)   08/17/18 198 lb (89.8 kg)       Present on Admission:  **None**       Anesthesia Alerts:  PONV with general only. CKD, stage III     Malignant Hyperthermia:  NO     History of Sleep Apnea:   No    REVIEW OF SYSTEMS:      EYES: cataracts removed. Left retinal tear repair, 1999. Wears glasses    HEENT: [place], wears crys hearing aids    RESPIRATORY:   Non smoker  Able to lie flat. CARDIOVASCULAR: HTN, HLD, hx palpitations, resolved. On beta blocker and ASA, last dose 9/5/2018  Echo  10/2017  EF 50-60%  Left Ventricle   Left ventricular systolic function is normal    No regional wall motion abnormalities were detected.   Mild concentric left ventricular hypertrophy.   Left ventricle size is normal.   Grade I diastolic dysfunction.     Holter  30 day of event monitor findings suggestive of low-grade atrial and ventricular ectopy and multiple episodes of dizziness and shortness of breath during normal rate and rhythm.  Isolated narrow complex tachycardia at a rate of 151 bpm occurred with reported symptoms of dizziness as well    GASTROINTESTINAL:  Controlled reflux, s/p katiuska, 1980's. GENITOURINARY:  REBECA, CKD stage III, elevated BUN and CR 1.2-1.4 baseline. Followed by Dr. Suzy Harrsi. INTEGUMENT:  Neg     BREAST/GYN:  S/p hysterectomy, 1972. HEMATOLOGIC/LYMPHATIC:     ENDOCRINE: DM, last HbA1c was 5.9, ~8/2018. On metformin  Thyroid dz, on replacement    MUSCULOSKELETAL: right CTS    NEUROLOGICAL:  RLS, on requip    BEHAVIOR/PSYCH:  Alert and oriented x 4. Questions answered. Understanding verbalized    PHYSICAL EXAM:  Airway Exam:  Head/Neck movement: full  Thyromental Distance: > 3 FB  History of difficult intubation:  None  Mallampati Classification:  Class II  Teeth: denies missing or loose teeth        LUNGS:  No increased work of breathing, good air exchange, clear to auscultation bilaterally, no crackles or wheezing  CARDIOVASCULAR:  Normal apical impulse, regular rate and rhythm, normal S1 and S2, no S3 or S4, and no murmur noted    Testing Results:    EKG:  Normal sinus rhythm   Inferior infarct (cited on or before 18-JUL-2017)   ST & T wave abnormality, consider anterolateral ischemia   Abnormal ECG   When compared with ECG of 18-JUL-2017 17:09,   Vent. rate has decreased BY  62 BPM   Minimal criteria for Anterior infarct are no longer present   T wave inversion more evident in Anterolateral leads   9/6/2018    LABS:      CBC  Lab Results   Component Value Date/Time    WBC 5.8 09/06/2018 08:34 AM    HGB 13.6 09/06/2018 08:34 AM    HCT 42.7 09/06/2018 08:34 AM     09/06/2018 08:34 AM     RENAL  Lab Results   Component Value Date/Time     08/28/2018 09:43 AM    K 4.4 08/28/2018 09:43 AM     08/28/2018 09:43 AM    CO2 25 08/28/2018 09:43 AM    BUN 18 08/28/2018 09:43 AM    CREATININE 1.2 08/28/2018 09:43 AM    GLUCOSE 121 (H) 08/28/2018 09:43 AM     Summary:  Chart reviewed, pt. Interviewed and examined. Surgery to be performed at [facility]  Planned surgical procedure:  Right CTR per Dr. Regan Mena.     1.

## 2018-09-06 ENCOUNTER — HOSPITAL ENCOUNTER (OUTPATIENT)
Dept: PREADMISSION TESTING | Age: 83
Discharge: HOME | End: 2018-09-06
Attending: ORTHOPAEDIC SURGERY | Admitting: ORTHOPAEDIC SURGERY

## 2018-09-06 VITALS
BODY MASS INDEX: 38.89 KG/M2 | HEART RATE: 58 BPM | RESPIRATION RATE: 16 BRPM | HEIGHT: 61 IN | DIASTOLIC BLOOD PRESSURE: 65 MMHG | OXYGEN SATURATION: 96 % | SYSTOLIC BLOOD PRESSURE: 139 MMHG | WEIGHT: 206 LBS | TEMPERATURE: 97.6 F

## 2018-09-06 LAB
EKG ATRIAL RATE: 60 BPM
EKG DIAGNOSIS: NORMAL
EKG P AXIS: 38 DEGREES
EKG P-R INTERVAL: 200 MS
EKG Q-T INTERVAL: 452 MS
EKG QRS DURATION: 94 MS
EKG QTC CALCULATION (BAZETT): 452 MS
EKG R AXIS: -10 DEGREES
EKG T AXIS: 145 DEGREES
EKG VENTRICULAR RATE: 60 BPM
HCT VFR BLD CALC: 42.7 % (ref 37–47)
HEMOGLOBIN: 13.6 GM/DL (ref 12.5–16)
MCH RBC QN AUTO: 28.6 PG (ref 27–31)
MCHC RBC AUTO-ENTMCNC: 31.9 % (ref 32–36)
MCV RBC AUTO: 89.7 FL (ref 78–100)
PDW BLD-RTO: 12.6 % (ref 11.7–14.9)
PLATELET # BLD: 237 K/CU MM (ref 140–440)
PMV BLD AUTO: 10.7 FL (ref 7.5–11.1)
RBC # BLD: 4.76 M/CU MM (ref 4.2–5.4)
WBC # BLD: 5.8 K/CU MM (ref 4–10.5)

## 2018-09-06 RX ORDER — ACETAMINOPHEN 160 MG
TABLET,DISINTEGRATING ORAL NIGHTLY
COMMUNITY
End: 2020-02-06

## 2018-09-06 RX ORDER — PANTOPRAZOLE SODIUM 40 MG/1
40 TABLET, DELAYED RELEASE ORAL NIGHTLY
COMMUNITY
End: 2019-10-07

## 2018-09-06 ASSESSMENT — PAIN SCALES - GENERAL: PAINLEVEL_OUTOF10: 0

## 2018-09-06 NOTE — PRE-PROCEDURE INSTRUCTIONS
See scanned instruction form, instructed to take Lopressor (Metoprolol) 25 mg, Zantac 150 mg, Levothyroxine 125 mcg with just a sip of water only the morning of surgery per anesthesia department

## 2018-09-11 ASSESSMENT — ENCOUNTER SYMPTOMS: SHORTNESS OF BREATH: 1

## 2018-09-11 NOTE — H&P
sickness     PONV (postoperative nausea and vomiting)     Restless leg syndrome     Shortness of breath on exertion     REBECA (stress urinary incontinence, female)     Thyroid disease     Wears glasses     Wears hearing aid     Bilateral        Past Surgical History:   Procedure Laterality Date    BLEPHAROPLASTY Bilateral 08/24/2009   [facility] CARDIAC CATHETERIZATION  2005    \"Normal\"    CHOLECYSTECTOMY  0495'H    \"They Also  Removed An Ovary, Not Sure Which Side\"    COLONOSCOPY  04/2014    HX: polyps    COLONOSCOPY  08/21/2018    internal grade 1 hemorrhoids, severe diverticulosis found in sigmoid colon and descending colon, three 4mm and 2mm sessile polyps found in rectum, two 3mm polyps found in mid ascending colon, two 7mm sessile polyps in descending colon, single 2 mm polyp in splenic flexure, 4 mm polyp found in colon    DILATION AND CURETTAGE OF UTERUS      \"At Least 3 Prior To [facility] In [facility]\"    EYE SURGERY Bilateral 2011    Cataracts With Lens Implants    EYE SURGERY Left 1999    \"Tear In The Retina\"    HYSTERECTOMY, TOTAL ABDOMINAL  1972    JOINT REPLACEMENT Right 09/26/2007    Total Right Knee    JOINT REPLACEMENT Left 11/06/2007    Total Left Knee    SKIN CANCER EXCISION  In Past    Face, Right Shoulder       Family History   Problem Relation Age of Onset    Heart Disease Mother     Breast Cancer Mother     Heart Disease Father     Diabetes Father     High Blood Pressure Father     Cancer Father     Stroke Father        Social History     Social History    Marital status:      Spouse name: N/A    Number of children: N/A    Years of education: N/A     Social History Main Topics    Smoking status: Never Smoker    Smokeless tobacco: Never Used    Alcohol use Yes      Comment: \"Maybe 3 Times A Year\"    Drug use: No    Sexual activity: No     Other Topics Concern    Not on file     Social History Narrative    No narrative on file       Current Outpatient Prescriptions in the right arm muscles.  Nerve  conduction velocities are within normal limits. Impression:  right carpal tunnel syndrome      Plan:    I had an extensive discussion with Ms. Angelica Sood and any family members present regarding the natural history, etiology, and long term consequences of this problem. I have outlined a treatment plan with them and, in my opinion, surgical intervention is indicated at this time. I have discussed with them the potential complications, limitations, expectations, alternatives, and risks of Carpal Tunnel Release(s). They have had full opportunity to ask their questions. I have answered them all to their satisfaction. I feel that the patient and any present family members do understand our discussion today and they have provided informed consent for right carpal tunnel release(s).        Full PAT   Consent signed today  No meds list given   Stop Asprin 5 days prior  Post op meds day of surgery   Follow up for surgery as scheduled on September 12th

## 2018-09-11 NOTE — ANESTHESIA PRE-OP
rOPINIRole (REQUIP) 0.5 MG tablet Take 2 tablets by mouth nightly 2/6/18   Leidy Silvestre MD   L-Arginine 500 MG CAPS Take 500 mg by mouth every morning Over The Counter    Historical Provider, MD   Omega-3 Fatty Acids (FISH OIL) 1000 MG CAPS Take 1 capsule by mouth 2 times daily  Patient taking differently: Take 1,000 mg by mouth daily  3/3/17 8/28/18  Leidy Silvestre MD   aspirin 81 MG tablet Take 81 mg by mouth nightly Over The Counter    Historical Provider, MD       Current medications:    Current Outpatient Prescriptions   Medication Sig Dispense Refill    pantoprazole (PROTONIX) 40 MG tablet Take 40 mg by mouth nightly      metFORMIN (GLUCOPHAGE) 500 MG tablet Take 500 mg by mouth every morning      Multiple Vitamins-Minerals (MULTIVITAMIN PO) Take by mouth nightly Over The Counter      Calcium Carb-Cholecalciferol (CALCIUM + D3 PO) Take by mouth 2 times daily Over The Counter      Omega-3 Fatty Acids (FISH OIL PO) Take 1,000 mg by mouth every morning Over The Counter      Docusate Calcium (STOOL SOFTENER PO) Take by mouth nightly Over The Counter      Cholecalciferol (VITAMIN D3) 2000 units CAPS Take by mouth nightly Over The Counter      magnesium oxide (MAG-OX) 400 (241.3 Mg) MG TABS tablet Take 1 tablet by mouth daily 30 tablet 6    losartan (COZAAR) 50 MG tablet TAKE 1 TABLET TWICE DAILY 180 tablet 1    amLODIPine (NORVASC) 5 MG tablet TAKE 1 TABLET BY MOUTH at night every day and in morning if SBP > 150 (Patient taking differently: Take 5 mg by mouth nightly Take 1 tablet daily if SBP > 150) 90 tablet 3    levothyroxine (SYNTHROID) 125 MCG tablet Take 1 tablet by mouth daily 90 tablet 2    metoprolol tartrate (LOPRESSOR) 25 MG tablet Take 1 tablet by mouth 2 times daily 180 tablet 1    ranitidine (ZANTAC) 150 MG tablet Take 1 tablet by mouth 2 times daily (Patient taking differently: Take 150 mg by mouth every morning ) 180 tablet 1    rOPINIRole (REQUIP) 0.5 MG tablet Take 2 tablets were no vitals filed for this visit. BP Readings from Last 3 Encounters:   09/06/18 139/65   08/28/18 130/70   08/21/18 (!) 146/80       NPO Status:                                                                                 BMI:   Wt Readings from Last 3 Encounters:   09/06/18 206 lb (93.4 kg)   08/28/18 205 lb (93 kg)   08/21/18 205 lb (93 kg)     There is no height or weight on file to calculate BMI. Anesthesia Evaluation  Patient summary reviewed and Nursing notes reviewed   history of anesthetic complications: PONV. Airway: Mallampati: II     Neck ROM: full   Dental:    (+) caps      Pulmonary:normal exam    (+) shortness of breath:                            ROS comment: Never smoked   Cardiovascular:    (+) hypertension:, valvular problems/murmurs: AI and MR, CAD:, PINEDA:, hyperlipidemia         Beta Blocker:  Order written      ROS comment: History of nuclear stress test cardiolite-EF69%,small lateral wall ischemia      Neuro/Psych:                ROS comment: RLS    Claustrophobia GI/Hepatic/Renal:   (+) GERD:, renal disease: CRI,           Endo/Other:    (+) DiabetesType II DM, , hypothyroidism::., .                 Abdominal:           Vascular:                                      Anesthesia Plan      MAC     ASA 3       Induction: intravenous. Anesthetic plan and risks discussed with patient. Plan discussed with CRNA.     Attending anesthesiologist reviewed and agrees with Lisa Winters MD   9/11/2018

## 2018-09-12 ENCOUNTER — HOSPITAL ENCOUNTER (OUTPATIENT)
Dept: SURGERY | Age: 83
Discharge: HOME | End: 2018-09-12
Attending: ORTHOPAEDIC SURGERY | Admitting: ORTHOPAEDIC SURGERY

## 2018-09-12 VITALS
TEMPERATURE: 98.6 F | HEART RATE: 58 BPM | WEIGHT: 207 LBS | RESPIRATION RATE: 16 BRPM | HEIGHT: 61 IN | BODY MASS INDEX: 39.08 KG/M2 | SYSTOLIC BLOOD PRESSURE: 146 MMHG | OXYGEN SATURATION: 97 % | DIASTOLIC BLOOD PRESSURE: 78 MMHG

## 2018-09-12 DIAGNOSIS — G56.01 CARPAL TUNNEL SYNDROME OF RIGHT WRIST: Primary | ICD-10-CM

## 2018-09-12 LAB — GLUCOSE BLD-MCNC: 112 MG/DL (ref 70–99)

## 2018-09-12 PROCEDURE — 64721 CARPAL TUNNEL SURGERY: CPT | Performed by: ORTHOPAEDIC SURGERY

## 2018-09-12 RX ORDER — SODIUM CHLORIDE 0.9 % (FLUSH) 0.9 %
10 SYRINGE (ML) INJECTION PRN
Status: DISCONTINUED | OUTPATIENT
Start: 2018-09-12 | End: 2018-09-13 | Stop reason: HOSPADM

## 2018-09-12 RX ORDER — SODIUM CHLORIDE 0.9 % (FLUSH) 0.9 %
10 SYRINGE (ML) INJECTION EVERY 12 HOURS SCHEDULED
Status: DISCONTINUED | OUTPATIENT
Start: 2018-09-12 | End: 2018-09-13 | Stop reason: HOSPADM

## 2018-09-12 RX ORDER — HYDROCODONE BITARTRATE AND ACETAMINOPHEN 5; 325 MG/1; MG/1
1 TABLET ORAL EVERY 6 HOURS PRN
Qty: 5 TABLET | Refills: 0 | Status: SHIPPED | OUTPATIENT
Start: 2018-09-12 | End: 2018-09-15

## 2018-09-12 RX ORDER — SODIUM CHLORIDE, SODIUM LACTATE, POTASSIUM CHLORIDE, CALCIUM CHLORIDE 600; 310; 30; 20 MG/100ML; MG/100ML; MG/100ML; MG/100ML
INJECTION, SOLUTION INTRAVENOUS CONTINUOUS
Status: DISCONTINUED | OUTPATIENT
Start: 2018-09-12 | End: 2018-09-13 | Stop reason: HOSPADM

## 2018-09-12 RX ORDER — ONDANSETRON 2 MG/ML
4 INJECTION INTRAMUSCULAR; INTRAVENOUS EVERY 6 HOURS PRN
Status: DISCONTINUED | OUTPATIENT
Start: 2018-09-12 | End: 2018-09-13 | Stop reason: HOSPADM

## 2018-09-12 RX ORDER — ACETAMINOPHEN 325 MG/1
650 TABLET ORAL EVERY 4 HOURS PRN
Status: DISCONTINUED | OUTPATIENT
Start: 2018-09-12 | End: 2018-09-13 | Stop reason: HOSPADM

## 2018-09-12 RX ADMIN — SODIUM CHLORIDE, SODIUM LACTATE, POTASSIUM CHLORIDE, CALCIUM CHLORIDE: 600; 310; 30; 20 INJECTION, SOLUTION INTRAVENOUS at 07:06

## 2018-09-12 ASSESSMENT — PAIN SCALES - GENERAL
PAINLEVEL_OUTOF10: 0
PAINLEVEL_OUTOF10: 0

## 2018-09-12 ASSESSMENT — PAIN - FUNCTIONAL ASSESSMENT: PAIN_FUNCTIONAL_ASSESSMENT: 0-10

## 2018-09-12 NOTE — OP NOTE
[address] [place] [address], [address] [place] [address]                                 OPERATIVE REPORT    PATIENT NAME: Taylor Montenegro                  :        1935  MED REC NO:   [id number]                          ROOM:  ACCOUNT NO:   [de-identified]                          ADMIT DATE: 2018  PROVIDER:     Sergio Cortes MD    DATE OF PROCEDURE:  2018    PREOPERATIVE DIAGNOSIS:  Right carpal tunnel syndrome. POSTOPERATIVE DIAGNOSIS:  Right carpal tunnel syndrome. SURGICAL PROCEDURE:  Right carpal tunnel release. SURGEON:  Sergio Cortes MD    ANESTHESIA:  Local and MAC.    TOURNIQUET TIME:  Six minutes. ESTIMATED BLOOD LOSS:  Minimal.    COMPLICATIONS:  None. CONDITION:  Stable to recovery room. INDICATION:  The patient is an 72-year-old female with persistent symptoms  of right carpal tunnel syndrome. See the history and physical for further  details. DESCRIPTION OF PROCEDURE:  After formal consent and proper identification,  the patient was brought back to the main operating room. A tourniquet was  placed high in the right upper arm. A formal timeout was done. Preoperative antibiotics were given. The arm was prepped with alcohol, and  after IV sedation, a local wrist block was performed with a 50:50 mixture  of plain lidocaine and plain Marcaine. The arm was then prepped and draped  in normal sterile fashion. After elevation and exsanguination, the tourniquet was then placed up. An  incision was then made starting just distal to the wrist flexion crease,  extending distally, approximately 3 cm in line with the ulnar aspect of the  middle finger. Sharp dissection continued down until the transverse carpal  ligament was identified. It was then released under direct visualization. The contents of the carpal tunnel were inspected. There were no  abnormalities found.   The tourniquet was then placed

## 2018-09-12 NOTE — ANESTHESIA POST-OP
Anesthesia Post-op Note    Patient: Tamela Ayon  MRN: [id number]  YOB: 1935  Date of evaluation: 9/12/2018  Time:  9:11 AM     Procedure(s) Performed:     Last Vitals: BP (!) 146/78   Pulse 58   Temp 98.6 °F (37 °C) (Temporal)   Resp 16   Ht 5' 1\" (1.549 m)   Wt 207 lb (93.9 kg)   SpO2 97%   BMI 39.11 kg/m²     Spencer Phase I:      Spencer Phase II: Spencer Score: 10    Anesthesia Post Evaluation    Final anesthesia type: TIVA  Patient location during evaluation: PACU  Patient participation: complete - patient participated  Level of consciousness: awake and alert  Pain score: 0  Airway patency: patent  Nausea & Vomiting: no nausea and no vomiting  Complications: no  Cardiovascular status: hemodynamically stable  Respiratory status: acceptable, room air and spontaneous ventilation  Hydration status: stable  Comments: Ice pack. Ready to go home.         HARDIK Alamo CRNA  9:11 AM

## 2018-09-17 ENCOUNTER — OFFICE VISIT (OUTPATIENT)
Dept: ORTHOPEDIC SURGERY | Age: 83
End: 2018-09-17

## 2018-09-17 VITALS — HEIGHT: 61 IN | RESPIRATION RATE: 16 BRPM | BODY MASS INDEX: 39.08 KG/M2 | WEIGHT: 207 LBS

## 2018-09-17 DIAGNOSIS — Z98.890 S/P CARPAL TUNNEL RELEASE: Primary | ICD-10-CM

## 2018-09-17 PROCEDURE — 99024 POSTOP FOLLOW-UP VISIT: CPT | Performed by: PHYSICIAN ASSISTANT

## 2018-09-17 NOTE — PROGRESS NOTES
Date of surgery:   September 12th 2018    History:  Ms. Unruly Akers is here in follow up regarding her right carpal tunnel release. She is doing rather well. She is having 3/10 pain. She denies chest pain, SOB, calf pain,fever,wound drainage. No other issues. Physical: She demonstrates appropriate mood and affect. rightArm exam:  The incision is clean, dry, intact and nontender with no erythema. The incision/palm has mild edema. ROM of the wrist and fingers is full to mildly reduced, there is a 2+ radial pulse, cap refill intact, and finger sensibility is intact with trace tingling. Impression:  S/p right carpal tunnel release, doing well     Plan:  -new dressing placed, remove later today then can keep incision covered with the ACE wrap or a bandaid  -you may wash with mild soap and water and shower normally, but do not submerge the hand under water (dirty dish water, bath, etc.) until the sutures are removed. -continue to work on motion of the fingers and wrist, straightening each finger fully and bending each finger fully, bending wrist forward and bending wrist backwards.   -you may begin using the hand as it feels comfortable, but no gripping or lifting heavy objects for several weeks. -you may expect to see some skin peel off around the incision. You may be left with a small area of pink baby skin. This is quite normal.  -follow as scheduled about 12-14 days after your surgery for suture removal.  -follow up sooner or call for any questions or problems.

## 2018-09-19 DIAGNOSIS — E03.9 ACQUIRED HYPOTHYROIDISM: ICD-10-CM

## 2018-09-20 RX ORDER — FLUVASTATIN SODIUM 80 MG/1
TABLET, FILM COATED, EXTENDED RELEASE ORAL
Qty: 90 TABLET | Refills: 1 | Status: SHIPPED | OUTPATIENT
Start: 2018-09-20 | End: 2018-11-22 | Stop reason: SDUPTHER

## 2018-09-20 RX ORDER — LEVOTHYROXINE SODIUM 0.12 MG/1
125 TABLET ORAL DAILY
Qty: 90 TABLET | Refills: 2 | Status: SHIPPED | OUTPATIENT
Start: 2018-09-20 | End: 2019-05-08 | Stop reason: SDUPTHER

## 2018-09-25 ENCOUNTER — OFFICE VISIT (OUTPATIENT)
Dept: ORTHOPEDIC SURGERY | Age: 83
End: 2018-09-25

## 2018-09-25 VITALS — BODY MASS INDEX: 39.27 KG/M2 | WEIGHT: 208 LBS | HEIGHT: 61 IN | RESPIRATION RATE: 14 BRPM

## 2018-09-25 DIAGNOSIS — Z98.890 S/P CARPAL TUNNEL RELEASE: Primary | ICD-10-CM

## 2018-09-25 PROCEDURE — 99024 POSTOP FOLLOW-UP VISIT: CPT | Performed by: ORTHOPAEDIC SURGERY

## 2018-09-25 NOTE — PROGRESS NOTES
Date of surgery:   September 12th 2018    History:  Ms. Karen Sanchez is here in follow up regarding her right carpal tunnel release. She is doing rather well. She is having 0-1/10 pain. She denies chest pain, SOB, calf pain,fever,wound drainage. No other issues. Physical: She demonstrates appropriate mood and affect. rightArm exam:  The incision is clean, dry, intact and nontender with no erythema. The incision/palm has mild edema. ROM of the wrist and fingers is full, there is a 2+ radial pulse, cap refill intact, and finger sensibility is intact with no tingling. Impression:  S/p right carpal tunnel release, doing well     Plan:  -sutures removed   -continue to work on motion of the fingers and wrist, straightening each finger fully and bending each finger fully, bending wrist forward and bending wrist backwards.   -you may begin using the hand as it feels comfortable, but no gripping or lifting heavy objects for several weeks.   -.follow up as needed

## 2018-10-16 DIAGNOSIS — K21.9 GASTROESOPHAGEAL REFLUX DISEASE, ESOPHAGITIS PRESENCE NOT SPECIFIED: ICD-10-CM

## 2018-10-17 RX ORDER — RANITIDINE 150 MG/1
150 TABLET ORAL 2 TIMES DAILY
Qty: 180 TABLET | Refills: 1 | Status: SHIPPED | OUTPATIENT
Start: 2018-10-17 | End: 2019-05-15 | Stop reason: SDUPTHER

## 2018-11-06 ENCOUNTER — OFFICE VISIT (OUTPATIENT)
Dept: INTERNAL MEDICINE CLINIC | Age: 83
End: 2018-11-06
Payer: MEDICARE

## 2018-11-06 VITALS
SYSTOLIC BLOOD PRESSURE: 122 MMHG | WEIGHT: 204 LBS | OXYGEN SATURATION: 97 % | BODY MASS INDEX: 37.54 KG/M2 | HEART RATE: 66 BPM | DIASTOLIC BLOOD PRESSURE: 80 MMHG | HEIGHT: 62 IN

## 2018-11-06 DIAGNOSIS — E11.22 TYPE 2 DIABETES MELLITUS WITH STAGE 3 CHRONIC KIDNEY DISEASE, WITHOUT LONG-TERM CURRENT USE OF INSULIN (HCC): Primary | ICD-10-CM

## 2018-11-06 DIAGNOSIS — E78.5 HYPERLIPIDEMIA, UNSPECIFIED HYPERLIPIDEMIA TYPE: ICD-10-CM

## 2018-11-06 DIAGNOSIS — N18.31 CHRONIC KIDNEY DISEASE (CKD) STAGE G3A/A1, MODERATELY DECREASED GLOMERULAR FILTRATION RATE (GFR) BETWEEN 45-59 ML/MIN/1.73 SQUARE METER AND ALBUMINURIA CREATININE RATIO LESS THAN 30 MG/G (HCC): ICD-10-CM

## 2018-11-06 DIAGNOSIS — I10 ESSENTIAL HYPERTENSION: ICD-10-CM

## 2018-11-06 DIAGNOSIS — I87.2 VENOUS STASIS DERMATITIS OF BOTH LOWER EXTREMITIES: ICD-10-CM

## 2018-11-06 DIAGNOSIS — N18.30 TYPE 2 DIABETES MELLITUS WITH STAGE 3 CHRONIC KIDNEY DISEASE, WITHOUT LONG-TERM CURRENT USE OF INSULIN (HCC): Primary | ICD-10-CM

## 2018-11-06 DIAGNOSIS — E03.9 ACQUIRED HYPOTHYROIDISM: ICD-10-CM

## 2018-11-06 PROCEDURE — 1101F PT FALLS ASSESS-DOCD LE1/YR: CPT | Performed by: INTERNAL MEDICINE

## 2018-11-06 PROCEDURE — 4040F PNEUMOC VAC/ADMIN/RCVD: CPT | Performed by: INTERNAL MEDICINE

## 2018-11-06 PROCEDURE — 99214 OFFICE O/P EST MOD 30 MIN: CPT | Performed by: INTERNAL MEDICINE

## 2018-11-06 PROCEDURE — G8427 DOCREV CUR MEDS BY ELIG CLIN: HCPCS | Performed by: INTERNAL MEDICINE

## 2018-11-06 PROCEDURE — G8482 FLU IMMUNIZE ORDER/ADMIN: HCPCS | Performed by: INTERNAL MEDICINE

## 2018-11-06 PROCEDURE — G8417 CALC BMI ABV UP PARAM F/U: HCPCS | Performed by: INTERNAL MEDICINE

## 2018-11-06 PROCEDURE — 1036F TOBACCO NON-USER: CPT | Performed by: INTERNAL MEDICINE

## 2018-11-06 PROCEDURE — G8400 PT W/DXA NO RESULTS DOC: HCPCS | Performed by: INTERNAL MEDICINE

## 2018-11-06 PROCEDURE — 1123F ACP DISCUSS/DSCN MKR DOCD: CPT | Performed by: INTERNAL MEDICINE

## 2018-11-06 PROCEDURE — 1090F PRES/ABSN URINE INCON ASSESS: CPT | Performed by: INTERNAL MEDICINE

## 2018-11-06 RX ORDER — METFORMIN HYDROCHLORIDE 500 MG/1
500 TABLET, EXTENDED RELEASE ORAL
Qty: 30 TABLET | Refills: 3 | Status: SHIPPED | OUTPATIENT
Start: 2018-11-06 | End: 2019-01-16 | Stop reason: SDUPTHER

## 2018-11-06 RX ORDER — AMLODIPINE BESYLATE 5 MG/1
TABLET ORAL
Qty: 90 TABLET | Refills: 3 | Status: SHIPPED | OUTPATIENT
Start: 2018-11-06 | End: 2018-11-30 | Stop reason: SDUPTHER

## 2018-11-06 RX ORDER — GLUCOSAMINE HCL/CHONDROITIN SU 500-400 MG
CAPSULE ORAL
Qty: 60 STRIP | Refills: 11 | Status: SHIPPED | OUTPATIENT
Start: 2018-11-06 | End: 2018-11-27 | Stop reason: SDUPTHER

## 2018-11-06 RX ORDER — LANCETS 30 GAUGE
EACH MISCELLANEOUS
Qty: 60 EACH | Refills: 11 | Status: SHIPPED | OUTPATIENT
Start: 2018-11-06

## 2018-11-07 PROBLEM — N18.30 TYPE 2 DIABETES MELLITUS WITH STAGE 3 CHRONIC KIDNEY DISEASE, WITHOUT LONG-TERM CURRENT USE OF INSULIN (HCC): Status: ACTIVE | Noted: 2017-07-06

## 2018-11-07 PROBLEM — E11.22 TYPE 2 DIABETES MELLITUS WITH STAGE 3 CHRONIC KIDNEY DISEASE, WITHOUT LONG-TERM CURRENT USE OF INSULIN (HCC): Status: ACTIVE | Noted: 2017-07-06

## 2018-11-07 PROBLEM — R07.89 CHEST HEAVINESS: Status: RESOLVED | Noted: 2017-10-05 | Resolved: 2018-11-07

## 2018-11-15 ENCOUNTER — HOSPITAL ENCOUNTER (OUTPATIENT)
Age: 83
Setting detail: SPECIMEN
Discharge: HOME | End: 2018-11-15
Payer: MEDICARE

## 2018-11-20 ENCOUNTER — HOSPITAL ENCOUNTER (OUTPATIENT)
Age: 83
Setting detail: SPECIMEN
Discharge: HOME | End: 2018-11-20
Payer: MEDICARE

## 2018-11-20 LAB
ANION GAP SERPL CALCULATED.3IONS-SCNC: 11 MMOL/L (ref 4–16)
BUN BLDV-MCNC: 16 MG/DL (ref 6–23)
CALCIUM SERPL-MCNC: 9.7 MG/DL (ref 8.3–10.6)
CHLORIDE BLD-SCNC: 107 MMOL/L (ref 99–110)
CHOLESTEROL: 232 MG/DL
CO2: 27 MMOL/L (ref 21–32)
CREAT SERPL-MCNC: 1.3 MG/DL (ref 0.6–1.1)
GFR AFRICAN AMERICAN: 47 ML/MIN/1.73M2
GFR NON-AFRICAN AMERICAN: 39 ML/MIN/1.73M2
GLUCOSE BLD-MCNC: 101 MG/DL (ref 70–99)
HDLC SERPL-MCNC: 44 MG/DL
LDL CHOLESTEROL DIRECT: 185 MG/DL
POTASSIUM SERPL-SCNC: 4.3 MMOL/L (ref 3.5–5.1)
SODIUM BLD-SCNC: 145 MMOL/L (ref 135–145)
TRIGL SERPL-MCNC: 228 MG/DL

## 2018-11-20 PROCEDURE — 36415 COLL VENOUS BLD VENIPUNCTURE: CPT

## 2018-11-20 PROCEDURE — 83721 ASSAY OF BLOOD LIPOPROTEIN: CPT

## 2018-11-20 PROCEDURE — 80061 LIPID PANEL: CPT

## 2018-11-20 PROCEDURE — 80048 BASIC METABOLIC PNL TOTAL CA: CPT

## 2018-11-22 ENCOUNTER — TELEPHONE (OUTPATIENT)
Dept: INTERNAL MEDICINE CLINIC | Age: 83
End: 2018-11-22

## 2018-11-22 RX ORDER — FLUVASTATIN SODIUM 80 MG/1
80 TABLET, FILM COATED, EXTENDED RELEASE ORAL DAILY
Qty: 90 TABLET | Refills: 1 | Status: SHIPPED | OUTPATIENT
Start: 2018-11-22 | End: 2019-05-01 | Stop reason: SDUPTHER

## 2018-11-27 DIAGNOSIS — N18.30 TYPE 2 DIABETES MELLITUS WITH STAGE 3 CHRONIC KIDNEY DISEASE, WITHOUT LONG-TERM CURRENT USE OF INSULIN (HCC): ICD-10-CM

## 2018-11-27 DIAGNOSIS — E11.22 TYPE 2 DIABETES MELLITUS WITH STAGE 3 CHRONIC KIDNEY DISEASE, WITHOUT LONG-TERM CURRENT USE OF INSULIN (HCC): ICD-10-CM

## 2018-11-27 RX ORDER — GLUCOSAMINE HCL/CHONDROITIN SU 500-400 MG
CAPSULE ORAL
Qty: 100 STRIP | Refills: 11 | Status: SHIPPED | OUTPATIENT
Start: 2018-11-27

## 2018-11-28 ENCOUNTER — OFFICE VISIT (OUTPATIENT)
Dept: CARDIOLOGY CLINIC | Age: 83
End: 2018-11-28
Payer: MEDICARE

## 2018-11-28 VITALS
DIASTOLIC BLOOD PRESSURE: 68 MMHG | BODY MASS INDEX: 38.46 KG/M2 | HEART RATE: 62 BPM | HEIGHT: 62 IN | SYSTOLIC BLOOD PRESSURE: 120 MMHG | WEIGHT: 209 LBS

## 2018-11-28 DIAGNOSIS — I47.1 PSVT (PAROXYSMAL SUPRAVENTRICULAR TACHYCARDIA) (HCC): ICD-10-CM

## 2018-11-28 DIAGNOSIS — E11.22 TYPE 2 DIABETES MELLITUS WITH STAGE 3 CHRONIC KIDNEY DISEASE, WITHOUT LONG-TERM CURRENT USE OF INSULIN (HCC): ICD-10-CM

## 2018-11-28 DIAGNOSIS — N18.30 TYPE 2 DIABETES MELLITUS WITH STAGE 3 CHRONIC KIDNEY DISEASE, WITHOUT LONG-TERM CURRENT USE OF INSULIN (HCC): ICD-10-CM

## 2018-11-28 DIAGNOSIS — I10 ESSENTIAL HYPERTENSION: Primary | ICD-10-CM

## 2018-11-28 DIAGNOSIS — I38 VHD (VALVULAR HEART DISEASE): ICD-10-CM

## 2018-11-28 PROBLEM — I47.10 PSVT (PAROXYSMAL SUPRAVENTRICULAR TACHYCARDIA): Status: ACTIVE | Noted: 2018-11-28

## 2018-11-28 PROCEDURE — 1090F PRES/ABSN URINE INCON ASSESS: CPT | Performed by: INTERNAL MEDICINE

## 2018-11-28 PROCEDURE — 99214 OFFICE O/P EST MOD 30 MIN: CPT | Performed by: INTERNAL MEDICINE

## 2018-11-28 PROCEDURE — G8400 PT W/DXA NO RESULTS DOC: HCPCS | Performed by: INTERNAL MEDICINE

## 2018-11-28 PROCEDURE — 4040F PNEUMOC VAC/ADMIN/RCVD: CPT | Performed by: INTERNAL MEDICINE

## 2018-11-28 PROCEDURE — 1123F ACP DISCUSS/DSCN MKR DOCD: CPT | Performed by: INTERNAL MEDICINE

## 2018-11-28 PROCEDURE — G8482 FLU IMMUNIZE ORDER/ADMIN: HCPCS | Performed by: INTERNAL MEDICINE

## 2018-11-28 PROCEDURE — G8427 DOCREV CUR MEDS BY ELIG CLIN: HCPCS | Performed by: INTERNAL MEDICINE

## 2018-11-28 PROCEDURE — 1036F TOBACCO NON-USER: CPT | Performed by: INTERNAL MEDICINE

## 2018-11-28 PROCEDURE — 1101F PT FALLS ASSESS-DOCD LE1/YR: CPT | Performed by: INTERNAL MEDICINE

## 2018-11-28 PROCEDURE — G8417 CALC BMI ABV UP PARAM F/U: HCPCS | Performed by: INTERNAL MEDICINE

## 2018-11-28 NOTE — PROGRESS NOTES
204 lb (92.5 kg)   09/25/18 208 lb (94.3 kg)     Constitutional: Patient  Obese pleasant female in no apparent distress  Eyes: Pupils are equal in both eyes. She wears glasses. NECK: No JVP or thyromegaly  Cardiovascular: Auscultation: Normal S1 and S2. No murmurs or gallops. .  Carotids are negative for bruits. .  Abdominal aorta is nonpalpable. No epigastric bruit noted. Peripheral pulses: 1+ equal in both feet. Respiratory:  Respiratory effort is normal  Breath sounds are clear to auscultation. Extremities:  No clubbing,  Cyanosis, petechiae. Bilateral trace pitting edema noted with superficial venous varicosities. SKIN: Warm and well perfused, no pallor or cyanosis  Abdomen:  No masses or tenderness. No organomegaly noted. Musculoskeletal:  No spinal deformities noted. Gait normal.  Muscle strength is normal.  Neurologic:  Oriented to time, place, and person and non-anxious. No focal neurological deficit noted. Psychiatric: Normal mood and effect. LAB REVIEW:    CBC:   Lab Results   Component Value Date    WBC 5.8 09/06/2018    HGB 13.6 09/06/2018    HCT 42.7 09/06/2018     09/06/2018     Lipids:   Lab Results   Component Value Date    CHOL 232 (H) 11/20/2018    TRIG 228 (H) 11/20/2018    HDL 44 11/20/2018    LDLCALC 62 09/15/2016    LDLDIRECT 185 (H) 11/20/2018     Renal:   Lab Results   Component Value Date    BUN 16 11/20/2018    CREATININE 1.3 11/20/2018     11/20/2018    K 4.3 11/20/2018     IMPRESSION and RECOMMENDATIONS:      PSVT (paroxysmal supraventricular tachycardia) (HCC)  Symptoms are tolerable. Continue Lopressor 25 twice a day. If she has intolerable symptoms aren't has syncope or near syncope would recommend EP guided therapy. Details are discussed and multiple questions answered. Essential hypertension  Well-controlled on current medications continue the same. VHD (valvular heart disease)  Clinically stable.  Follow clinically    Type 2 diabetes mellitus with

## 2018-11-30 ENCOUNTER — TELEPHONE (OUTPATIENT)
Dept: INTERNAL MEDICINE CLINIC | Age: 83
End: 2018-11-30

## 2018-11-30 DIAGNOSIS — I10 ESSENTIAL HYPERTENSION: ICD-10-CM

## 2018-11-30 RX ORDER — AMLODIPINE BESYLATE 5 MG/1
TABLET ORAL
Qty: 90 TABLET | Refills: 1 | Status: SHIPPED | OUTPATIENT
Start: 2018-11-30 | End: 2019-02-05 | Stop reason: SDUPTHER

## 2018-11-30 NOTE — TELEPHONE ENCOUNTER
Mario Alberto Galeas from [facility] to clarify signature for amlodipine sent on 11-6-18. Please resend new rx please to [address].

## 2019-01-09 RX ORDER — LOSARTAN POTASSIUM 50 MG/1
TABLET ORAL
Qty: 180 TABLET | Refills: 1 | Status: SHIPPED | OUTPATIENT
Start: 2019-01-09 | End: 2019-03-26 | Stop reason: ALTCHOICE

## 2019-01-16 DIAGNOSIS — E11.22 TYPE 2 DIABETES MELLITUS WITH STAGE 3 CHRONIC KIDNEY DISEASE, WITHOUT LONG-TERM CURRENT USE OF INSULIN (HCC): ICD-10-CM

## 2019-01-16 DIAGNOSIS — N18.30 TYPE 2 DIABETES MELLITUS WITH STAGE 3 CHRONIC KIDNEY DISEASE, WITHOUT LONG-TERM CURRENT USE OF INSULIN (HCC): ICD-10-CM

## 2019-01-17 RX ORDER — METFORMIN HYDROCHLORIDE 500 MG/1
TABLET, EXTENDED RELEASE ORAL
Qty: 90 TABLET | Refills: 3 | Status: SHIPPED | OUTPATIENT
Start: 2019-01-17 | End: 2019-04-09

## 2019-01-29 ENCOUNTER — HOSPITAL ENCOUNTER (OUTPATIENT)
Age: 84
Discharge: HOME | End: 2019-01-29
Payer: MEDICARE

## 2019-01-29 ENCOUNTER — OFFICE VISIT (OUTPATIENT)
Dept: INTERNAL MEDICINE CLINIC | Age: 84
End: 2019-01-29
Payer: MEDICARE

## 2019-01-29 VITALS
WEIGHT: 204 LBS | SYSTOLIC BLOOD PRESSURE: 104 MMHG | HEART RATE: 50 BPM | OXYGEN SATURATION: 96 % | RESPIRATION RATE: 12 BRPM | DIASTOLIC BLOOD PRESSURE: 60 MMHG | BODY MASS INDEX: 37.54 KG/M2 | HEIGHT: 62 IN

## 2019-01-29 DIAGNOSIS — N18.31 CHRONIC KIDNEY DISEASE (CKD) STAGE G3A/A1, MODERATELY DECREASED GLOMERULAR FILTRATION RATE (GFR) BETWEEN 45-59 ML/MIN/1.73 SQUARE METER AND ALBUMINURIA CREATININE RATIO LESS THAN 30 MG/G (HCC): ICD-10-CM

## 2019-01-29 DIAGNOSIS — E03.9 ACQUIRED HYPOTHYROIDISM: ICD-10-CM

## 2019-01-29 DIAGNOSIS — E78.2 MIXED HYPERLIPIDEMIA: ICD-10-CM

## 2019-01-29 DIAGNOSIS — I10 ESSENTIAL HYPERTENSION: ICD-10-CM

## 2019-01-29 DIAGNOSIS — E11.22 TYPE 2 DIABETES MELLITUS WITH STAGE 3 CHRONIC KIDNEY DISEASE, WITHOUT LONG-TERM CURRENT USE OF INSULIN (HCC): ICD-10-CM

## 2019-01-29 DIAGNOSIS — N18.30 TYPE 2 DIABETES MELLITUS WITH STAGE 3 CHRONIC KIDNEY DISEASE, WITHOUT LONG-TERM CURRENT USE OF INSULIN (HCC): ICD-10-CM

## 2019-01-29 DIAGNOSIS — R42 VERTIGO: ICD-10-CM

## 2019-01-29 DIAGNOSIS — G25.81 RESTLESS LEG SYNDROME: ICD-10-CM

## 2019-01-29 DIAGNOSIS — R42 VERTIGO: Primary | ICD-10-CM

## 2019-01-29 LAB
BASOPHILS ABSOLUTE: 0.1 K/CU MM
BASOPHILS RELATIVE PERCENT: 1.4 % (ref 0–1)
CHOLESTEROL: 188 MG/DL
DIFFERENTIAL TYPE: ABNORMAL
EOSINOPHILS ABSOLUTE: 0.3 K/CU MM
EOSINOPHILS RELATIVE PERCENT: 4.2 % (ref 0–3)
HCT VFR BLD CALC: 46.4 % (ref 37–47)
HDLC SERPL-MCNC: 47 MG/DL
HEMOGLOBIN: 14.7 GM/DL (ref 12.5–16)
IMMATURE NEUTROPHIL %: 0.2 % (ref 0–0.43)
LDL CHOLESTEROL DIRECT: 123 MG/DL
LYMPHOCYTES ABSOLUTE: 2.1 K/CU MM
LYMPHOCYTES RELATIVE PERCENT: 32.6 % (ref 24–44)
MCH RBC QN AUTO: 29.2 PG (ref 27–31)
MCHC RBC AUTO-ENTMCNC: 31.7 % (ref 32–36)
MCV RBC AUTO: 92.2 FL (ref 78–100)
MONOCYTES ABSOLUTE: 0.5 K/CU MM
MONOCYTES RELATIVE PERCENT: 7 % (ref 0–4)
NUCLEATED RBC %: 0 %
PDW BLD-RTO: 12.5 % (ref 11.7–14.9)
PLATELET # BLD: 238 K/CU MM (ref 140–440)
PMV BLD AUTO: 11.6 FL (ref 7.5–11.1)
RBC # BLD: 5.03 M/CU MM (ref 4.2–5.4)
SEGMENTED NEUTROPHILS ABSOLUTE COUNT: 3.5 K/CU MM
SEGMENTED NEUTROPHILS RELATIVE PERCENT: 54.6 % (ref 36–66)
T4 FREE: 1.62 NG/DL (ref 0.9–1.8)
TOTAL IMMATURE NEUTOROPHIL: 0.01 K/CU MM
TOTAL NUCLEATED RBC: 0 K/CU MM
TRIGL SERPL-MCNC: 205 MG/DL
TSH HIGH SENSITIVITY: 6.5 UIU/ML (ref 0.27–4.2)
WBC # BLD: 6.4 K/CU MM (ref 4–10.5)

## 2019-01-29 PROCEDURE — 85025 COMPLETE CBC W/AUTO DIFF WBC: CPT

## 2019-01-29 PROCEDURE — G8482 FLU IMMUNIZE ORDER/ADMIN: HCPCS | Performed by: INTERNAL MEDICINE

## 2019-01-29 PROCEDURE — 1036F TOBACCO NON-USER: CPT | Performed by: INTERNAL MEDICINE

## 2019-01-29 PROCEDURE — 84439 ASSAY OF FREE THYROXINE: CPT

## 2019-01-29 PROCEDURE — G8417 CALC BMI ABV UP PARAM F/U: HCPCS | Performed by: INTERNAL MEDICINE

## 2019-01-29 PROCEDURE — 1101F PT FALLS ASSESS-DOCD LE1/YR: CPT | Performed by: INTERNAL MEDICINE

## 2019-01-29 PROCEDURE — 99214 OFFICE O/P EST MOD 30 MIN: CPT | Performed by: INTERNAL MEDICINE

## 2019-01-29 PROCEDURE — 1123F ACP DISCUSS/DSCN MKR DOCD: CPT | Performed by: INTERNAL MEDICINE

## 2019-01-29 PROCEDURE — G8400 PT W/DXA NO RESULTS DOC: HCPCS | Performed by: INTERNAL MEDICINE

## 2019-01-29 PROCEDURE — 1090F PRES/ABSN URINE INCON ASSESS: CPT | Performed by: INTERNAL MEDICINE

## 2019-01-29 PROCEDURE — 84443 ASSAY THYROID STIM HORMONE: CPT

## 2019-01-29 PROCEDURE — G8427 DOCREV CUR MEDS BY ELIG CLIN: HCPCS | Performed by: INTERNAL MEDICINE

## 2019-01-29 PROCEDURE — 36415 COLL VENOUS BLD VENIPUNCTURE: CPT

## 2019-01-29 PROCEDURE — 80061 LIPID PANEL: CPT

## 2019-01-29 PROCEDURE — 4040F PNEUMOC VAC/ADMIN/RCVD: CPT | Performed by: INTERNAL MEDICINE

## 2019-01-29 PROCEDURE — 83721 ASSAY OF BLOOD LIPOPROTEIN: CPT

## 2019-01-29 RX ORDER — MECLIZINE HYDROCHLORIDE 25 MG/1
25 TABLET ORAL 3 TIMES DAILY PRN
Qty: 30 TABLET | Refills: 0 | Status: SHIPPED | OUTPATIENT
Start: 2019-01-29 | End: 2019-04-09 | Stop reason: SDUPTHER

## 2019-02-01 ENCOUNTER — TELEPHONE (OUTPATIENT)
Dept: INTERNAL MEDICINE CLINIC | Age: 84
End: 2019-02-01

## 2019-02-01 DIAGNOSIS — H81.10 BENIGN PAROXYSMAL POSITIONAL VERTIGO, UNSPECIFIED LATERALITY: Primary | ICD-10-CM

## 2019-02-04 DIAGNOSIS — I10 ESSENTIAL HYPERTENSION: ICD-10-CM

## 2019-02-05 RX ORDER — AMLODIPINE BESYLATE 5 MG/1
TABLET ORAL
Qty: 180 TABLET | Refills: 1 | Status: SHIPPED | OUTPATIENT
Start: 2019-02-05

## 2019-02-13 ENCOUNTER — HOSPITAL ENCOUNTER (OUTPATIENT)
Dept: PHYSICAL THERAPY | Age: 84
Setting detail: THERAPIES SERIES
Discharge: HOME | End: 2019-02-13
Payer: MEDICARE

## 2019-02-13 PROCEDURE — 97112 NEUROMUSCULAR REEDUCATION: CPT

## 2019-02-13 PROCEDURE — 97162 PT EVAL MOD COMPLEX 30 MIN: CPT

## 2019-02-27 ENCOUNTER — HOSPITAL ENCOUNTER (OUTPATIENT)
Dept: PHYSICAL THERAPY | Age: 84
Setting detail: THERAPIES SERIES
Discharge: HOME | End: 2019-02-27
Payer: MEDICARE

## 2019-02-27 PROCEDURE — 97112 NEUROMUSCULAR REEDUCATION: CPT

## 2019-03-14 ENCOUNTER — HOSPITAL ENCOUNTER (OUTPATIENT)
Dept: PHYSICAL THERAPY | Age: 84
Setting detail: THERAPIES SERIES
Discharge: HOME | End: 2019-03-14
Payer: MEDICARE

## 2019-03-14 PROCEDURE — 97112 NEUROMUSCULAR REEDUCATION: CPT

## 2019-03-19 ENCOUNTER — OFFICE VISIT (OUTPATIENT)
Dept: INTERNAL MEDICINE CLINIC | Age: 84
End: 2019-03-19
Payer: MEDICARE

## 2019-03-19 VITALS
SYSTOLIC BLOOD PRESSURE: 144 MMHG | DIASTOLIC BLOOD PRESSURE: 87 MMHG | BODY MASS INDEX: 36.32 KG/M2 | HEIGHT: 63 IN | WEIGHT: 205 LBS | HEART RATE: 57 BPM | OXYGEN SATURATION: 98 %

## 2019-03-19 DIAGNOSIS — Z13.6 SCREENING FOR CARDIOVASCULAR CONDITION: ICD-10-CM

## 2019-03-19 DIAGNOSIS — Z12.31 ENCOUNTER FOR SCREENING MAMMOGRAM FOR BREAST CANCER: ICD-10-CM

## 2019-03-19 DIAGNOSIS — Z00.00 ROUTINE GENERAL MEDICAL EXAMINATION AT A HEALTH CARE FACILITY: ICD-10-CM

## 2019-03-19 DIAGNOSIS — Z23 NEED FOR PROPHYLACTIC VACCINATION AGAINST STREPTOCOCCUS PNEUMONIAE (PNEUMOCOCCUS): ICD-10-CM

## 2019-03-19 DIAGNOSIS — Z71.89 ACP (ADVANCE CARE PLANNING): ICD-10-CM

## 2019-03-19 DIAGNOSIS — Z23 NEED FOR PROPHYLACTIC VACCINATION AND INOCULATION AGAINST VARICELLA: ICD-10-CM

## 2019-03-19 DIAGNOSIS — Z12.39 BREAST CANCER SCREENING: Primary | ICD-10-CM

## 2019-03-19 PROCEDURE — G0439 PPPS, SUBSEQ VISIT: HCPCS | Performed by: INTERNAL MEDICINE

## 2019-03-19 PROCEDURE — G0447 BEHAVIOR COUNSEL OBESITY 15M: HCPCS | Performed by: INTERNAL MEDICINE

## 2019-03-19 PROCEDURE — 99497 ADVNCD CARE PLAN 30 MIN: CPT | Performed by: INTERNAL MEDICINE

## 2019-03-19 PROCEDURE — 4040F PNEUMOC VAC/ADMIN/RCVD: CPT | Performed by: INTERNAL MEDICINE

## 2019-03-19 PROCEDURE — G8482 FLU IMMUNIZE ORDER/ADMIN: HCPCS | Performed by: INTERNAL MEDICINE

## 2019-03-19 PROCEDURE — G0446 INTENS BEHAVE THER CARDIO DX: HCPCS | Performed by: INTERNAL MEDICINE

## 2019-03-19 ASSESSMENT — PATIENT HEALTH QUESTIONNAIRE - PHQ9
SUM OF ALL RESPONSES TO PHQ QUESTIONS 1-9: 0
SUM OF ALL RESPONSES TO PHQ QUESTIONS 1-9: 0

## 2019-03-19 ASSESSMENT — LIFESTYLE VARIABLES: HOW OFTEN DO YOU HAVE A DRINK CONTAINING ALCOHOL: 0

## 2019-03-19 ASSESSMENT — ANXIETY QUESTIONNAIRES: GAD7 TOTAL SCORE: 0

## 2019-03-26 ENCOUNTER — OFFICE VISIT (OUTPATIENT)
Dept: INTERNAL MEDICINE CLINIC | Age: 84
End: 2019-03-26
Payer: MEDICARE

## 2019-03-26 ENCOUNTER — HOSPITAL ENCOUNTER (OUTPATIENT)
Age: 84
Discharge: HOME | End: 2019-03-26
Payer: MEDICARE

## 2019-03-26 ENCOUNTER — TELEPHONE (OUTPATIENT)
Dept: INTERNAL MEDICINE CLINIC | Age: 84
End: 2019-03-26

## 2019-03-26 ENCOUNTER — HOSPITAL ENCOUNTER (OUTPATIENT)
Dept: GENERAL RADIOLOGY | Age: 84
Discharge: HOME | End: 2019-03-26
Payer: MEDICARE

## 2019-03-26 VITALS
TEMPERATURE: 97 F | DIASTOLIC BLOOD PRESSURE: 70 MMHG | WEIGHT: 201 LBS | HEART RATE: 73 BPM | SYSTOLIC BLOOD PRESSURE: 110 MMHG | BODY MASS INDEX: 35.61 KG/M2 | OXYGEN SATURATION: 96 %

## 2019-03-26 DIAGNOSIS — R11.2 NON-INTRACTABLE VOMITING WITH NAUSEA, UNSPECIFIED VOMITING TYPE: ICD-10-CM

## 2019-03-26 DIAGNOSIS — R53.83 MALAISE AND FATIGUE: ICD-10-CM

## 2019-03-26 DIAGNOSIS — R53.81 MALAISE AND FATIGUE: ICD-10-CM

## 2019-03-26 DIAGNOSIS — R42 DIZZINESS: Primary | ICD-10-CM

## 2019-03-26 DIAGNOSIS — R05.9 COUGH: ICD-10-CM

## 2019-03-26 LAB
ALBUMIN SERPL-MCNC: 4.4 GM/DL (ref 3.4–5)
ALP BLD-CCNC: 77 IU/L (ref 40–129)
ALT SERPL-CCNC: 13 U/L (ref 10–40)
ANION GAP SERPL CALCULATED.3IONS-SCNC: 16 MMOL/L (ref 4–16)
AST SERPL-CCNC: 21 IU/L (ref 15–37)
BASOPHILS ABSOLUTE: 0.1 K/CU MM
BASOPHILS RELATIVE PERCENT: 0.4 % (ref 0–1)
BILIRUB SERPL-MCNC: 0.8 MG/DL (ref 0–1)
BILIRUBIN DIRECT: 0.2 MG/DL (ref 0–0.3)
BILIRUBIN, INDIRECT: 0.6 MG/DL (ref 0–0.7)
BUN BLDV-MCNC: 17 MG/DL (ref 6–23)
CALCIUM SERPL-MCNC: 9.5 MG/DL (ref 8.3–10.6)
CHLORIDE BLD-SCNC: 96 MMOL/L (ref 99–110)
CO2: 24 MMOL/L (ref 21–32)
CREAT SERPL-MCNC: 1.3 MG/DL (ref 0.6–1.1)
DIFFERENTIAL TYPE: ABNORMAL
EOSINOPHILS ABSOLUTE: 0 K/CU MM
EOSINOPHILS RELATIVE PERCENT: 0 % (ref 0–3)
GFR AFRICAN AMERICAN: 47 ML/MIN/1.73M2
GFR NON-AFRICAN AMERICAN: 39 ML/MIN/1.73M2
GLUCOSE BLD-MCNC: 112 MG/DL (ref 70–99)
HCT VFR BLD CALC: 44.6 % (ref 37–47)
HEMOGLOBIN: 14.1 GM/DL (ref 12.5–16)
IMMATURE NEUTROPHIL %: 0.2 % (ref 0–0.43)
LYMPHOCYTES ABSOLUTE: 2 K/CU MM
LYMPHOCYTES RELATIVE PERCENT: 16.4 % (ref 24–44)
MCH RBC QN AUTO: 29.4 PG (ref 27–31)
MCHC RBC AUTO-ENTMCNC: 31.6 % (ref 32–36)
MCV RBC AUTO: 93.1 FL (ref 78–100)
MONOCYTES ABSOLUTE: 1 K/CU MM
MONOCYTES RELATIVE PERCENT: 7.7 % (ref 0–4)
NUCLEATED RBC %: 0 %
PDW BLD-RTO: 12.5 % (ref 11.7–14.9)
PLATELET # BLD: 190 K/CU MM (ref 140–440)
PMV BLD AUTO: 12.2 FL (ref 7.5–11.1)
POTASSIUM SERPL-SCNC: 4.2 MMOL/L (ref 3.5–5.1)
RBC # BLD: 4.79 M/CU MM (ref 4.2–5.4)
SEGMENTED NEUTROPHILS ABSOLUTE COUNT: 9.3 K/CU MM
SEGMENTED NEUTROPHILS RELATIVE PERCENT: 75.3 % (ref 36–66)
SODIUM BLD-SCNC: 136 MMOL/L (ref 135–145)
TOTAL IMMATURE NEUTOROPHIL: 0.03 K/CU MM
TOTAL NUCLEATED RBC: 0 K/CU MM
TOTAL PROTEIN: 7.3 GM/DL (ref 6.4–8.2)
WBC # BLD: 12.4 K/CU MM (ref 4–10.5)

## 2019-03-26 PROCEDURE — 1123F ACP DISCUSS/DSCN MKR DOCD: CPT | Performed by: INTERNAL MEDICINE

## 2019-03-26 PROCEDURE — G8417 CALC BMI ABV UP PARAM F/U: HCPCS | Performed by: INTERNAL MEDICINE

## 2019-03-26 PROCEDURE — 82248 BILIRUBIN DIRECT: CPT

## 2019-03-26 PROCEDURE — 85025 COMPLETE CBC W/AUTO DIFF WBC: CPT

## 2019-03-26 PROCEDURE — G8482 FLU IMMUNIZE ORDER/ADMIN: HCPCS | Performed by: INTERNAL MEDICINE

## 2019-03-26 PROCEDURE — G8427 DOCREV CUR MEDS BY ELIG CLIN: HCPCS | Performed by: INTERNAL MEDICINE

## 2019-03-26 PROCEDURE — 99214 OFFICE O/P EST MOD 30 MIN: CPT | Performed by: INTERNAL MEDICINE

## 2019-03-26 PROCEDURE — 1090F PRES/ABSN URINE INCON ASSESS: CPT | Performed by: INTERNAL MEDICINE

## 2019-03-26 PROCEDURE — 4040F PNEUMOC VAC/ADMIN/RCVD: CPT | Performed by: INTERNAL MEDICINE

## 2019-03-26 PROCEDURE — 36415 COLL VENOUS BLD VENIPUNCTURE: CPT

## 2019-03-26 PROCEDURE — G8400 PT W/DXA NO RESULTS DOC: HCPCS | Performed by: INTERNAL MEDICINE

## 2019-03-26 PROCEDURE — 1101F PT FALLS ASSESS-DOCD LE1/YR: CPT | Performed by: INTERNAL MEDICINE

## 2019-03-26 PROCEDURE — 71046 X-RAY EXAM CHEST 2 VIEWS: CPT

## 2019-03-26 PROCEDURE — 1036F TOBACCO NON-USER: CPT | Performed by: INTERNAL MEDICINE

## 2019-03-26 PROCEDURE — 80053 COMPREHEN METABOLIC PANEL: CPT

## 2019-03-26 RX ORDER — DEXTROMETHORPHAN HYDROBROMIDE AND PROMETHAZINE HYDROCHLORIDE 15; 6.25 MG/5ML; MG/5ML
5 SYRUP ORAL 2 TIMES DAILY PRN
Qty: 118 ML | Refills: 0 | Status: SHIPPED | OUTPATIENT
Start: 2019-03-26 | End: 2019-03-26 | Stop reason: SDUPTHER

## 2019-03-26 RX ORDER — ONDANSETRON 4 MG/1
4 TABLET, ORALLY DISINTEGRATING ORAL EVERY 6 HOURS
Qty: 20 TABLET | Refills: 0 | Status: SHIPPED | OUTPATIENT
Start: 2019-03-26 | End: 2019-04-09 | Stop reason: ALTCHOICE

## 2019-03-26 RX ORDER — AZITHROMYCIN 250 MG/1
250 TABLET, FILM COATED ORAL DAILY
Qty: 1 PACKET | Refills: 0 | Status: SHIPPED | OUTPATIENT
Start: 2019-03-26 | End: 2019-03-26 | Stop reason: ALTCHOICE

## 2019-03-26 RX ORDER — OSELTAMIVIR PHOSPHATE 75 MG/1
75 CAPSULE ORAL 2 TIMES DAILY
Qty: 10 CAPSULE | Refills: 0 | Status: SHIPPED | OUTPATIENT
Start: 2019-03-26 | End: 2019-03-31

## 2019-03-26 RX ORDER — DEXTROMETHORPHAN HYDROBROMIDE AND PROMETHAZINE HYDROCHLORIDE 15; 6.25 MG/5ML; MG/5ML
5 SYRUP ORAL 2 TIMES DAILY PRN
Qty: 118 ML | Refills: 0 | Status: SHIPPED | OUTPATIENT
Start: 2019-03-26 | End: 2019-04-09 | Stop reason: ALTCHOICE

## 2019-03-26 RX ORDER — OSELTAMIVIR PHOSPHATE 75 MG/1
75 CAPSULE ORAL 2 TIMES DAILY
Qty: 10 CAPSULE | Refills: 0 | Status: SHIPPED | OUTPATIENT
Start: 2019-03-26 | End: 2019-03-26 | Stop reason: SDUPTHER

## 2019-03-26 RX ORDER — LEVOFLOXACIN 500 MG/1
500 TABLET, FILM COATED ORAL DAILY
Qty: 10 TABLET | Refills: 0 | Status: SHIPPED | OUTPATIENT
Start: 2019-03-26 | End: 2019-04-05

## 2019-03-26 RX ORDER — ONDANSETRON 4 MG/1
4 TABLET, ORALLY DISINTEGRATING ORAL EVERY 6 HOURS
Qty: 20 TABLET | Refills: 0 | Status: SHIPPED | OUTPATIENT
Start: 2019-03-26 | End: 2019-03-26 | Stop reason: SDUPTHER

## 2019-04-02 ENCOUNTER — OFFICE VISIT (OUTPATIENT)
Dept: INTERNAL MEDICINE CLINIC | Age: 84
End: 2019-04-02
Payer: MEDICARE

## 2019-04-02 VITALS
DIASTOLIC BLOOD PRESSURE: 70 MMHG | SYSTOLIC BLOOD PRESSURE: 110 MMHG | WEIGHT: 202.9 LBS | BODY MASS INDEX: 35.94 KG/M2 | HEART RATE: 69 BPM | OXYGEN SATURATION: 97 %

## 2019-04-02 DIAGNOSIS — I10 ESSENTIAL HYPERTENSION: ICD-10-CM

## 2019-04-02 DIAGNOSIS — Z91.81 AT HIGH RISK FOR FALLS: ICD-10-CM

## 2019-04-02 DIAGNOSIS — I95.9 HYPOTENSION, UNSPECIFIED HYPOTENSION TYPE: ICD-10-CM

## 2019-04-02 DIAGNOSIS — R42 DIZZINESS: ICD-10-CM

## 2019-04-02 DIAGNOSIS — J18.9 PNEUMONIA OF LEFT LOWER LOBE DUE TO INFECTIOUS ORGANISM: Primary | ICD-10-CM

## 2019-04-02 DIAGNOSIS — I47.1 PSVT (PAROXYSMAL SUPRAVENTRICULAR TACHYCARDIA) (HCC): ICD-10-CM

## 2019-04-02 PROCEDURE — 1036F TOBACCO NON-USER: CPT | Performed by: INTERNAL MEDICINE

## 2019-04-02 PROCEDURE — G8400 PT W/DXA NO RESULTS DOC: HCPCS | Performed by: INTERNAL MEDICINE

## 2019-04-02 PROCEDURE — 1123F ACP DISCUSS/DSCN MKR DOCD: CPT | Performed by: INTERNAL MEDICINE

## 2019-04-02 PROCEDURE — 1090F PRES/ABSN URINE INCON ASSESS: CPT | Performed by: INTERNAL MEDICINE

## 2019-04-02 PROCEDURE — G8417 CALC BMI ABV UP PARAM F/U: HCPCS | Performed by: INTERNAL MEDICINE

## 2019-04-02 PROCEDURE — 99213 OFFICE O/P EST LOW 20 MIN: CPT | Performed by: INTERNAL MEDICINE

## 2019-04-02 PROCEDURE — G8427 DOCREV CUR MEDS BY ELIG CLIN: HCPCS | Performed by: INTERNAL MEDICINE

## 2019-04-02 PROCEDURE — 4040F PNEUMOC VAC/ADMIN/RCVD: CPT | Performed by: INTERNAL MEDICINE

## 2019-04-02 RX ORDER — LEVOFLOXACIN 500 MG/1
500 TABLET, FILM COATED ORAL DAILY
Qty: 5 TABLET | Refills: 0 | Status: SHIPPED | OUTPATIENT
Start: 2019-04-02 | End: 2019-04-07

## 2019-04-02 NOTE — PROGRESS NOTES
Name: Suzette Coley  [id number]  Age: 81 y.o. YOB: 1935  Sex: female    CHIEF COMPLAINT:    Chief Complaint   Patient presents with    Pneumonia     Follow up. Improving but still coughing and is still on her antibiotic.  Dizziness     Improving    Hypotension     Improving    Palpitations     One episode       HISTORY OF PRESENT ILLNESS:     This is a pleasant  81 y.o. female who is here for f/u of pneumonia, hypotension and dizziness. Cough is better but still bringing up green phlegm. No more vomiting or diarrhea. No more abdominal pain. Patient states that she has been monitoring her Blood pressure. It is better now. She is still holding Norvasc. Denies any more dizziness. She is slowly regaining her strength. Patient states that she had an episode of palpitations and her heart rate was 168, it lasted for about 10 minutes. She has history of paroxysmal supraventricular tachycardia. Since it happens only once in 2-3 months, she did not pursue it.  is accompanying the patient. Old records reviewed via [employer] with the patient.     Past Medical History:        Diagnosis Date    #[id number] In Past     Skin Cancer Excised Face, Right Shoulder In Past    Abnormal ECG 10/5/2017    Acid reflux     Arthritis     \"Hands\"    CKD (chronic kidney disease), stage III (HCC)     Sees Dr. Lavonia Cooks Diabetes mellitus [facility]) Dx 1990's    \"Pre Diabetic\"    Essential hypertension 3/5/2017    Heart palpitations     Sees Dr. Elder Meraz History of nuclear stress test 10/12/2017    cardiolite-EF69%,small lateral wall ischemia    [place] (hard of hearing)     Bilateral Hearing Aids    Holter monitor, abnormal 10/05/2017    Hx of Doppler echocardiogram 10/11/2017    EF50-60%,increased LVOT and AOV    Hx of echocardiogram 10/06/2016    EF54% Mild to mod MR, See media    Hyperlipidemia 3/5/2017    Lactose intolerance     Motion sickness     PONV (postoperative nausea and vomiting)     PSVT (paroxysmal supraventricular tachycardia) ([address] 11/28/2018    10/2017 monitor 150 bpm PSVT    Restless leg syndrome     Shortness of breath on exertion     REBECA (stress urinary incontinence, female)     Thyroid disease     Type 2 diabetes mellitus with stage 3 chronic kidney disease, without long-term current use of insulin ([address] 7/6/2017    Wears glasses     Wears hearing aid     Bilateral        Past Surgical History:        Procedure Laterality Date    BLEPHAROPLASTY Bilateral 08/24/2009    CARDIAC CATHETERIZATION  2005    \"Normal\"    CARPAL TUNNEL RELEASE  09/12/2018    CHOLECYSTECTOMY  1980's    \"They Also  Removed An Ovary, Not Sure Which Side\"    COLONOSCOPY  04/2014    HX: polyps    COLONOSCOPY  08/21/2018    internal grade 1 hemorrhoids, severe diverticulosis found in sigmoid colon and descending colon, three 4mm and 2mm sessile polyps found in rectum, two 3mm polyps found in mid ascending colon, two 7mm sessile polyps in descending colon, single 2 mm polyp in splenic flexure, 4 mm polyp found in colon    DILATION AND CURETTAGE OF UTERUS      \"At Least 3 Prior To LATRICIA In 1972\"    EYE SURGERY Bilateral 2011    Cataracts With Lens Implants    EYE SURGERY Left 1999    \"Tear In The Retina\"    HYSTERECTOMY, TOTAL ABDOMINAL  1972    JOINT REPLACEMENT Right 09/26/2007    Total Right Knee    JOINT REPLACEMENT Left 11/06/2007    Total Left Knee    PERIPHERAL PERCUTANEOUS VENOUS INTERVENTION      Dr Skyla Ness  In Past    Face, Right Shoulder       Social History:   Social History     Tobacco Use    Smoking status: Never Smoker    Smokeless tobacco: Never Used   Substance Use Topics    Alcohol use: Yes     Comment: \"Maybe 3 Times A Year\"       Family History:       Problem Relation Age of Onset    Heart Disease Mother     Breast Cancer Mother     Heart Disease Father     Diabetes Father     High Blood Pressure Father     Cancer Father    [facility] Stroke Father        Allergies:  Codeine; Lactose intolerance (gi); Lipitor [atorvastatin]; Penicillins; Phenobarbital; Sulfa antibiotics; Tape [adhesive tape]; Vancomycin; and Zocor [simvastatin]    Current Medications :      Prior to Admission medications    Medication Sig Start Date End Date Taking? Authorizing Provider   levofloxacin (LEVAQUIN) 500 MG tablet Take 1 tablet by mouth daily for 5 days 4/2/19 4/7/19 Yes Priyanka Valadez MD   ondansetron (ZOFRAN ODT) 4 MG disintegrating tablet Take 1 tablet by mouth every 6 hours 3/26/19  Yes Helen Delarosa MD   promethazine-dextromethorphan (PROMETHAZINE-DM) 6.25-15 MG/5ML syrup Take 5 mLs by mouth 2 times daily as needed for Cough 3/26/19  Yes Helen Delarosa MD   levofloxacin (LEVAQUIN) 500 MG tablet Take 1 tablet by mouth daily for 10 days 3/26/19 4/5/19 Yes Helen Delarosa MD   zoster recombinant adjuvanted vaccine [facility]) 50 MCG/0.5ML SUSR injection Administer first dose 0.5 ml IM now. Repeat second dose 0.5 ml IM in 3 month from now.  3/19/19  Yes Priyanka Valadez MD   amLODIPine (NORVASC) 5 MG tablet TAKE 1 TABLET EVERY NIGHT AND IN THE MORNING IF SYSTOLIC BLOOD PRESSURE IS GREATER THAN 150 2/5/19  Yes Helen Delarosa MD   metFORMIN (GLUCOPHAGE-XR) 500 MG extended release tablet TAKE 1 TABLET EVERY DAY WITH BREAKFAST 1/17/19  Yes Priyanka Valadez MD   magnesium oxide (MAG-OX) 400 (241.3 Mg) MG TABS tablet TAKE 1 TABLET EVERY DAY 1/17/19  Yes Ulisses Patel MD   blood glucose monitor strips To check blood sugar twice daily with current Glucometer:   DX: diabetes type .00 11/27/18  Yes Helen Delarosa MD   fluvastatin (LESCOL XL) 80 MG extended release tablet Take 1 tablet by mouth daily 11/22/18  Yes Helen Delarosa MD   metoprolol tartrate (LOPRESSOR) 25 MG tablet Take 1 tablet by mouth 2 times daily 11/6/18  Yes Helen Delarosa MD   Lancets MISC To test twice daily  Dx: E 11.9 11/6/18  Yes Helen Delarosa MD   hydrocortisone 2.5 % cream Apply topically 2 times daily. 11/6/18  Yes Ritika Owens MD   potassium chloride (KLOR-CON M) 10 MEQ extended release tablet Take 1 tablet by mouth daily 10/18/18  Yes Anthony Barber MD   ranitidine (ZANTAC) 150 MG tablet Take 1 tablet by mouth 2 times daily 10/17/18  Yes Ritika Owens MD   levothyroxine (SYNTHROID) 125 MCG tablet Take 1 tablet by mouth daily  Patient taking differently: Take 137 mcg by mouth daily  9/20/18  Yes Ritika Owens MD   pantoprazole (PROTONIX) 40 MG tablet Take 40 mg by mouth nightly   Yes Historical Provider, MD   Multiple Vitamins-Minerals (MULTIVITAMIN PO) Take by mouth nightly Over The Counter   Yes Historical Provider, MD   Calcium Carb-Cholecalciferol (CALCIUM + D3 PO) Take by mouth 2 times daily Over The Counter   Yes Historical Provider, MD   Omega-3 Fatty Acids (FISH OIL PO) Take 1,000 mg by mouth nightly Over The Counter    Yes Historical Provider, MD   Docusate Calcium (STOOL SOFTENER PO) Take by mouth nightly Over The Counter   Yes Historical Provider, MD   Cholecalciferol (VITAMIN D3) 2000 units CAPS Take by mouth nightly Over The Counter   Yes Historical Provider, MD   rOPINIRole (REQUIP) 0.5 MG tablet Take 2 tablets by mouth nightly 2/6/18  Yes Ritika Owens MD   L-Arginine 500 MG CAPS Take 500 mg by mouth nightly Over The Counter   Yes Historical Provider, MD   aspirin 81 MG tablet Take 81 mg by mouth nightly Over The Counter   Yes Historical Provider, MD       LAB DATA: Reviewed. REVIEW OF SYSTEMS:   see HPI    PHYSICAL EXAMINATION:   /70 (Site: Left Upper Arm, Position: Sitting, Cuff Size: Medium Adult)   Pulse 69   Wt 202 lb 14.4 oz (92 kg)   SpO2 97%   BMI 35.94 kg/m²      GENERAL APPEARANCE:    Alert, oriented x 3, well developed, cooperative, not in any distress, appears stated age. HEAD:   Normocephalic, atraumatic   EYES:   PERRLA, EOMI, lids normal, conjuctivea clear, sclera anicteric.    NECK:    Supple, symmetrical, trachea midline, no thyromegaly, no JVD, no lymphadenopathy. LUNGS:    Better air entry in LL Lung field, no wheezing or rhonchi, respirations unlabored, accessory muscles are not used. HEART:     Regular rate and rhythm, S1 and S2 normal, no murmur, rub or gallop. PMI in MCL. ABDOMEN:    Soft, non-tender, bowel sounds are normoactive, no masses, no hepatospleenomegaly. EXTREMITY:   no bipedal edema  NEURO:  Alert, oriented to person, place and time. Grossly intact. Gait normal. No ataxia. PSYCH:  Mood euthymic, insight and judgement good, no SI or HI.         ASSESSMENT/PLAN:    Татьяна Main was seen today for pneumonia. Diagnoses and all orders for this visit:    Pneumonia of left lower lobe due to infectious organism ([address]  Continue Levaquin for 5 more days. Stay well hydrated. Chest x-ray in 2 weeks to ensure resolution.  -     levofloxacin (LEVAQUIN) 500 MG tablet; Take 1 tablet by mouth daily for 5 days  -     XR CHEST STANDARD (2 VW); Future    Dizziness:   Resolved. Hypotension:   Blood pressure has improved but patient has been holding Norvasc. Advised to continue monitoring blood pressure. PSVT:  Patient has history of paroxysmal supraventricular tachycardia. Advised to follow up with her cardiologist.      Health Maintenance Due   Topic Date Due    Shingles Vaccine (2 of 3) 11/07/2014    Pneumococcal 65+ years Vaccine (2 of 2 - PPSV23) 02/20/2019     . Care discussed with patient. Questions answered and patient verbalizes understanding and agrees with plan. Medications reviewed and reconciled. Continue current medications. Appropriate prescriptions are ordered. Risks and benefits of meds are discussed. After visit summary provided. Advised to call for any problems, questions, or concerns. If symptoms worsen or don't improve as expected, to call us or go to ER. Follow up as directed, sooner if needed.       Return if symptoms worsen or fail to improve, otherwise, for regular f/u, keep previously scheduled appointment. This dictation was performed with a verbal recognition program and it was checked for errors. It is possible that there are still dictated errors within this office note. Any errors should be brought immediately to my attention for correction. All efforts were made to ensure that this office note is accurate. Alvaro Tanner MD                  On the basis of positive falls risk screening, assessment and plan is as follows: Kyle Blackburn

## 2019-04-09 ENCOUNTER — OFFICE VISIT (OUTPATIENT)
Dept: INTERNAL MEDICINE CLINIC | Age: 84
End: 2019-04-09
Payer: MEDICARE

## 2019-04-09 VITALS
HEART RATE: 60 BPM | WEIGHT: 196.9 LBS | OXYGEN SATURATION: 94 % | DIASTOLIC BLOOD PRESSURE: 74 MMHG | SYSTOLIC BLOOD PRESSURE: 132 MMHG | BODY MASS INDEX: 34.88 KG/M2

## 2019-04-09 DIAGNOSIS — J18.9 PNEUMONIA OF LEFT LOWER LOBE DUE TO INFECTIOUS ORGANISM: Primary | ICD-10-CM

## 2019-04-09 DIAGNOSIS — J45.20 MILD INTERMITTENT REACTIVE AIRWAY DISEASE WITHOUT COMPLICATION: ICD-10-CM

## 2019-04-09 DIAGNOSIS — H81.10 BENIGN PAROXYSMAL POSITIONAL VERTIGO, UNSPECIFIED LATERALITY: ICD-10-CM

## 2019-04-09 PROCEDURE — 4040F PNEUMOC VAC/ADMIN/RCVD: CPT | Performed by: INTERNAL MEDICINE

## 2019-04-09 PROCEDURE — G8417 CALC BMI ABV UP PARAM F/U: HCPCS | Performed by: INTERNAL MEDICINE

## 2019-04-09 PROCEDURE — 99213 OFFICE O/P EST LOW 20 MIN: CPT | Performed by: INTERNAL MEDICINE

## 2019-04-09 PROCEDURE — 1036F TOBACCO NON-USER: CPT | Performed by: INTERNAL MEDICINE

## 2019-04-09 PROCEDURE — 1090F PRES/ABSN URINE INCON ASSESS: CPT | Performed by: INTERNAL MEDICINE

## 2019-04-09 PROCEDURE — G8427 DOCREV CUR MEDS BY ELIG CLIN: HCPCS | Performed by: INTERNAL MEDICINE

## 2019-04-09 PROCEDURE — 1123F ACP DISCUSS/DSCN MKR DOCD: CPT | Performed by: INTERNAL MEDICINE

## 2019-04-09 PROCEDURE — G8400 PT W/DXA NO RESULTS DOC: HCPCS | Performed by: INTERNAL MEDICINE

## 2019-04-09 RX ORDER — GUAIFENESIN 600 MG/1
600 TABLET, EXTENDED RELEASE ORAL 2 TIMES DAILY
Qty: 60 TABLET | Refills: 3 | Status: SHIPPED | OUTPATIENT
Start: 2019-04-09 | End: 2019-06-11

## 2019-04-09 RX ORDER — MONTELUKAST SODIUM 10 MG/1
10 TABLET ORAL DAILY
Qty: 30 TABLET | Refills: 3 | Status: SHIPPED | OUTPATIENT
Start: 2019-04-09 | End: 2019-10-07

## 2019-04-09 RX ORDER — ALBUTEROL SULFATE 90 UG/1
2 AEROSOL, METERED RESPIRATORY (INHALATION) EVERY 6 HOURS PRN
Qty: 1 INHALER | Refills: 11 | Status: SHIPPED | OUTPATIENT
Start: 2019-04-09 | End: 2020-02-06

## 2019-04-09 RX ORDER — MECLIZINE HYDROCHLORIDE 25 MG/1
25 TABLET ORAL 3 TIMES DAILY PRN
Qty: 30 TABLET | Refills: 0 | Status: SHIPPED | OUTPATIENT
Start: 2019-04-09 | End: 2019-04-19

## 2019-04-09 NOTE — PROGRESS NOTES
Name: Dionne Robledo  [id number]  Age: 81 y.o. YOB: 1935  Sex: female    CHIEF COMPLAINT:    Chief Complaint   Patient presents with    Pneumonia     Follow up from sick visit. Patient is still coughing and has complains of frequent dizzy spells. HISTORY OF PRESENT ILLNESS:     This is a 81 y.o. female who is feeling better slowly. Still has bouts of coughing which stops when she brings up phlegm, which is clear now. Some times she has wheezing and SOB during these bouts of coughing. Denies any fever or chill. Quick movement of head in either direction causes vertigo. She has history of benign positional vertigo and vestibular exercises haven't improved her symptoms. Now the symptoms are recurring. Meclizine helps. Recent tests reviewed via [facility] with the patient. Allergies:  Codeine; Lactose intolerance (gi); Lipitor [atorvastatin]; Penicillins; Phenobarbital; Sulfa antibiotics; Tape [adhesive tape];  Vancomycin; and Zocor [simvastatin]    Past Medical History:        Diagnosis Date    #[id number] In Past     Skin Cancer Excised Face, Right Shoulder In Past    Abnormal ECG 10/5/2017    Acid reflux     Arthritis     \"Hands\"    CKD (chronic kidney disease), stage III (HCC)     Sees Dr. Ignacio Severin Diabetes mellitus [facility]) Dx 1990's    \"Pre Diabetic\"    Essential hypertension 3/5/2017    Heart palpitations     Sees Dr. Alcazar Peer History of nuclear stress test 10/12/2017    cardiolite-EF69%,small lateral wall ischemia    [place] (hard of hearing)     Bilateral Hearing Aids    Holter monitor, abnormal 10/05/2017    Hx of Doppler echocardiogram 10/11/2017    EF50-60%,increased LVOT and AOV    Hx of echocardiogram 10/06/2016    EF54% Mild to mod MR, See media    Hyperlipidemia 3/5/2017    Lactose intolerance     Motion sickness     PONV (postoperative nausea and vomiting)     PSVT (paroxysmal supraventricular tachycardia) ([facility] [address] 11/28/2018    10/2017 monitor 150 release tablet Take 1 tablet by mouth 2 times daily 4/9/19  Yes Ce Plata MD   albuterol sulfate  (90 Base) MCG/ACT inhaler Inhale 2 puffs into the lungs every 6 hours as needed for Wheezing or Shortness of Breath 4/9/19 5/9/19 Yes Chichi Valadez MD   montelukast (SINGULAIR) 10 MG tablet Take 1 tablet by mouth daily 4/9/19  Yes Ce Plata MD   metoprolol tartrate (LOPRESSOR) 25 MG tablet TAKE 1 TABLET TWICE DAILY 4/3/19  Yes Ce Plata MD   amLODIPine (NORVASC) 5 MG tablet TAKE 1 TABLET EVERY NIGHT AND IN THE MORNING IF SYSTOLIC BLOOD PRESSURE IS GREATER THAN 150 2/5/19  Yes Ce Plata MD   magnesium oxide (MAG-OX) 400 (241.3 Mg) MG TABS tablet TAKE 1 TABLET EVERY DAY 1/17/19  Yes Kristi Mosher MD   blood glucose monitor strips To check blood sugar twice daily with current Glucometer:   DX: diabetes type .00 11/27/18  Yes Ce Plata MD   fluvastatin (LESCOL XL) 80 MG extended release tablet Take 1 tablet by mouth daily 11/22/18  Yes MD Eder Barriga MISC To test twice daily  Dx: E 11.9 11/6/18  Yes Ce Plata MD   hydrocortisone 2.5 % cream Apply topically 2 times daily.  11/6/18  Yes Ce Plata MD   potassium chloride (KLOR-CON M) 10 MEQ extended release tablet Take 1 tablet by mouth daily 10/18/18  Yes Kristi Mosher MD   ranitidine (ZANTAC) 150 MG tablet Take 1 tablet by mouth 2 times daily 10/17/18  Yes Ce Plata MD   levothyroxine (SYNTHROID) 125 MCG tablet Take 1 tablet by mouth daily  Patient taking differently: Take 137 mcg by mouth daily  9/20/18  Yes Ce Plata MD   pantoprazole (PROTONIX) 40 MG tablet Take 40 mg by mouth nightly   Yes Historical Provider, MD   Multiple Vitamins-Minerals (MULTIVITAMIN PO) Take by mouth nightly Over The Counter   Yes Historical Provider, MD   Calcium Carb-Cholecalciferol (CALCIUM + D3 PO) Take by mouth 2 times daily Over The Counter   Yes Historical Provider, MD   Omega-3 Fatty

## 2019-04-19 ENCOUNTER — HOSPITAL ENCOUNTER (OUTPATIENT)
Dept: GENERAL RADIOLOGY | Age: 84
Discharge: HOME | End: 2019-04-19
Payer: MEDICARE

## 2019-04-19 ENCOUNTER — HOSPITAL ENCOUNTER (OUTPATIENT)
Age: 84
Discharge: HOME | End: 2019-04-19
Payer: MEDICARE

## 2019-04-19 ENCOUNTER — HOSPITAL ENCOUNTER (OUTPATIENT)
Dept: WOMENS IMAGING | Age: 84
Discharge: HOME | End: 2019-04-19
Payer: MEDICARE

## 2019-04-19 DIAGNOSIS — Z12.39 BREAST CANCER SCREENING: ICD-10-CM

## 2019-04-19 DIAGNOSIS — J18.9 PNEUMONIA OF LEFT LOWER LOBE DUE TO INFECTIOUS ORGANISM: ICD-10-CM

## 2019-04-19 PROCEDURE — 71046 X-RAY EXAM CHEST 2 VIEWS: CPT

## 2019-04-19 PROCEDURE — 77063 BREAST TOMOSYNTHESIS BI: CPT

## 2019-04-20 ENCOUNTER — TELEPHONE (OUTPATIENT)
Dept: INTERNAL MEDICINE CLINIC | Age: 84
End: 2019-04-20

## 2019-04-20 DIAGNOSIS — R91.1 LUNG NODULE: Primary | ICD-10-CM

## 2019-04-22 ENCOUNTER — TELEPHONE (OUTPATIENT)
Dept: INTERNAL MEDICINE CLINIC | Age: 84
End: 2019-04-22

## 2019-04-23 ENCOUNTER — HOSPITAL ENCOUNTER (OUTPATIENT)
Dept: CT IMAGING | Age: 84
Discharge: HOME | End: 2019-04-23
Payer: MEDICARE

## 2019-04-23 DIAGNOSIS — R91.1 LUNG NODULE: ICD-10-CM

## 2019-04-23 PROCEDURE — 71250 CT THORAX DX C-: CPT

## 2019-05-07 ENCOUNTER — TELEPHONE (OUTPATIENT)
Dept: INTERNAL MEDICINE CLINIC | Age: 84
End: 2019-05-07

## 2019-05-07 NOTE — TELEPHONE ENCOUNTER
Pt came in stating she was told Levothyroxine was to be increased to 137mcg, but nothing has been sent to [facility]. Please advise. Thank you.

## 2019-05-08 ENCOUNTER — HOSPITAL ENCOUNTER (OUTPATIENT)
Dept: PHYSICAL THERAPY | Age: 84
Setting detail: THERAPIES SERIES
Discharge: HOME | End: 2019-05-08
Payer: MEDICARE

## 2019-05-08 DIAGNOSIS — E03.9 ACQUIRED HYPOTHYROIDISM: ICD-10-CM

## 2019-05-08 PROCEDURE — 97112 NEUROMUSCULAR REEDUCATION: CPT

## 2019-05-08 RX ORDER — LEVOTHYROXINE SODIUM 137 UG/1
137 TABLET ORAL DAILY
Qty: 90 TABLET | Refills: 1 | Status: SHIPPED | OUTPATIENT
Start: 2019-05-08 | End: 2019-08-20 | Stop reason: SDUPTHER

## 2019-05-08 NOTE — FLOWSHEET NOTE
Outpatient Physical Therapy  [place]           [x] Phone: [phone]   Fax: [phone]  [place]           [] Phone: [phone]   Fax: [phone]        Physical Therapy Daily Treatment Note  Date:  2019    Patient Name:  Esthela James    :  1935  MRN: [id number]  Restrictions/Precautions:    Diagnosis:   Diagnosis: BPPV  Date of Injury/Surgery: 2019  Treatment Diagnosis: Treatment Diagnosis: vestibular neuritis, decreased balance and tolerance with moving her head    Insurance/Certification information: PT Insurance Information: Medicare and [email or web address]   Referring Physician:  Referring Practitioner: Dr. Delmer Thomas  Next Doctor Visit:    Plan of care signed (Y/N):    Outcome Measure: [address] [place] [address]  Visit# / total visits:   / to   Pain level: 0/10   Goals:          Long term goals  Time Frame for Long term goals : 60 days  Long term goal 1: no vertigo  Long term goal 2: improve tolerance to moving head 10 reps comfortably  Long term goal 3: improve balance with walking  Long term goal 4: indep with home program    Summary of Evaluation: Assessment: Pt presents with  severe onset of vertigo and nausea 2019 and has gradually improved . She has low tolerance  with moving her head  and decreased balance. Today she tested negative for BPPV . Today she started with vestibular exs to improve her balance and tolerance for moving her head. No Medical equipment needs. Subjective:   She states she had pneumonia and vertigo returned  2 days -- needed Meclazine for 3 days . Today she has vertigo rarely walking. Overall she has become weaker and lost some of what she has gained. Any changes in Ambulatory Summary Sheet?   None        Objective:  See eval           Exercises: (No more than 4 columns)   Exercise/Equipment Date19 Date 19  #2 Date  3/14/19 #3 19 #4     DHI  24       WARM UP                        Sitting       horiz head turns looking at 2 objects 6x , mild difficulty 12 x easy and steady 12 x easy 8x  easy   Diagonal 1, and 2  4 x very much increased her symptoms hea Able to advance to standing, and looking at object on floor and up 6 xeach,    2x each direction    Sitting touch knees, back up straight, touch top of socks , back up 2 cycles,  Very difficult  Standing 3 cycles and doing much better  2 cycles  difficult   Head steady and moving eyes horiz/ vertical/ diagonal   5x without glasses on  Each direction  4x horiz and diagonal did well             STANDING       Ankle sways eyes open   Eyes closed each direction  10x each EO and EC,  Needs close guard, easily loses balance backwards 10x each with eyes open and eyes closed with only SBA each direction  10x each with eyes closed   5x eyes open  steady 10x eyes open   10x eyes open   Unable to do eyes closed   Walking with head rotation  30 sec  Mild dizziness      Iml Chi standing    4x  CW and CCW   Sewing   Mild problems but enjoy sewing and knitting  Difficult with vision and energy. 90* turns       Safety issues Night light on, avoid looking up, use straw, shower safety, thicker pillow  compliant       Nu step      Level 2   Seat 6  7 min                                              MODALITIES                         Other Therapeutic Activities/Education:        Home Exercise Program:  above      Manual Treatments:        Modalities:        Communication with other providers:        Assessment:  (Response towards treatment session)    Assessment: Pt presents with  severe onset of vertigo and nausea Jan 24, 2019 and has gradually improved . She has low tolerance  with moving her head  and decreased balance. Today she tested negative for BPPV . Today she started with vestibular exs to improve her balance and tolerance for moving her head. No Medical equipment needs.       Plan for Next Session:  advance exs ie to standing      Time In / Time Out:  1450 to 1515  Timed Code/Total Treatment Minutes:     25  NR  Next Progress Note due:  dc      Plan of Care Interventions:  [] Therapeutic Exercise  [] Modalities:  [] Therapeutic Activity     [] Ultrasound  [] Estim  [] Gait Training      [] Cervical Traction [] Lumbar Traction  [x] Neuromuscular Re-education    [] Cold/hotpack [] Iontophoresis   [x] Instruction in HEP      [] Vasopneumatic   [] Dry Needling    [] Manual Therapy               [] Aquatic Therapy              Electronically signed by:  Carlos Arellano 5/8/2019, 2:55 PM

## 2019-05-14 DIAGNOSIS — K21.9 GASTROESOPHAGEAL REFLUX DISEASE, ESOPHAGITIS PRESENCE NOT SPECIFIED: ICD-10-CM

## 2019-05-15 ENCOUNTER — HOSPITAL ENCOUNTER (OUTPATIENT)
Dept: PHYSICAL THERAPY | Age: 84
Setting detail: THERAPIES SERIES
Discharge: HOME | End: 2019-05-15
Payer: MEDICARE

## 2019-05-15 PROCEDURE — 97112 NEUROMUSCULAR REEDUCATION: CPT

## 2019-05-15 RX ORDER — FLUVASTATIN SODIUM 80 MG/1
TABLET, FILM COATED, EXTENDED RELEASE ORAL
Qty: 90 TABLET | Refills: 1 | Status: SHIPPED | OUTPATIENT
Start: 2019-05-15 | End: 2019-08-20 | Stop reason: SDUPTHER

## 2019-05-15 RX ORDER — RANITIDINE 150 MG/1
150 TABLET ORAL 2 TIMES DAILY
Qty: 180 TABLET | Refills: 1 | Status: SHIPPED | OUTPATIENT
Start: 2019-05-15 | End: 2019-08-20 | Stop reason: SDUPTHER

## 2019-05-15 NOTE — TELEPHONE ENCOUNTER
Patient seen on 4/9/19 and next visit is on 6/11/19. Patient is due for a refill on pended medication.

## 2019-05-15 NOTE — FLOWSHEET NOTE
Outpatient Physical Therapy  [place]           [x] Phone: [phone]   Fax: [phone]  [place]           [] Phone: [phone]   Fax: [phone]        Physical Therapy Daily Treatment Note  Date:  5/15/2019    Patient Name:  Skyler Jack    :  1935  MRN: [id number]  Restrictions/Precautions:    Diagnosis:   Diagnosis: BPPV  Date of Injury/Surgery: 2019  Treatment Diagnosis: Treatment Diagnosis: vestibular neuritis, decreased balance and tolerance with moving her head    Insurance/Certification information: PT Insurance Information: Medicare and [employer] [address]   Referring Physician:  Referring Practitioner: Dr. Nora Noriega  Next Doctor Visit:    Plan of care signed (Y/N):    Outcome Measure: [address] [place] [address]  Visit# / total visits:   / to 6  Pain level: 0/10   Goals:          Long term goals  Time Frame for Long term goals : 60 days  Long term goal 1: no vertigo  Long term goal 2: improve tolerance to moving head 10 reps comfortably  Long term goal 3: improve balance with walking  Long term goal 4: indep with home program    Summary of Evaluation: Assessment: Pt presents with  severe onset of vertigo and nausea 2019 and has gradually improved . She has low tolerance  with moving her head  and decreased balance. Today she tested negative for BPPV . Today she started with vestibular exs to improve her balance and tolerance for moving her head. No Medical equipment needs. Subjective:   She states she had pneumonia and vertigo returned  2 days -- needed Meclazine for 3 days . She states she has some difficulty with find a word. . Balance is better. Fatigue--less of a problem  Any changes in Ambulatory Summary Sheet?   None        Objective:  See eval           Exercises: (No more than 4 columns)   Exercise/Equipment Date19 Date 19  #2 Date  3/14/19 #3 19 #4   5/15/19 #5     DHI  24        WARM UP                           Sitting        horiz head turns looking at 2 objects 6x , mild difficulty 12 x easy and steady 12 x easy 8x  easy    Diagonal 1, and 2  4 x very much increased her symptoms hea Able to advance to standing, and looking at object on floor and up 6 xeach,    2x each direction     Sitting touch knees, back up straight, touch top of socks , back up 2 cycles,  Very difficult  Standing 3 cycles and doing much better  2 cycles  difficult    Head steady and moving eyes horiz/ vertical/ diagonal   5x without glasses on  Each direction  4x horiz and diagonal did well               STANDING        Ankle sways eyes open   Eyes closed each direction  10x each EO and EC,  Needs close guard, easily loses balance backwards 10x each with eyes open and eyes closed with only SBA each direction  10x each with eyes closed   5x eyes open  steady 10x eyes open   10x eyes open   Unable to do eyes closed 10x eyes open  10x eyes closed   Each direction on foam very difficult with eyes closed   Walking with head rotation  30 sec  Mild dizziness       Mil Chi standing    4x  CW and CCW    Sewing   Mild problems but enjoy sewing and knitting  Difficult with vision and energy. sta   90* turns        Safety issues Night light on, avoid looking up, use straw, shower safety, thicker pillow  compliant        Nu step      Level 2   Seat 6  7 min  Level 2  Seat /arms 7  10 min   Stretch heel cords  Heel raises / toe raises     1 min  10x  10x                                            MODALITIES                            Other Therapeutic Activities/Education:        Home Exercise Program:  above      Manual Treatments:        Modalities:        Communication with other providers:        Assessment:  (Response towards treatment session)    Assessment: Pt presents with  severe onset of vertigo and nausea Jan 24, 2019 and has gradually improved . She has low tolerance  with moving her head  and decreased balance. Today she tested negative for BPPV .   Today she started with vestibular exs to improve her balance and tolerance for moving her head. No Medical equipment needs.       Plan for Next Session:  advance exs ie to standing      Time In / Time Out:  1545 to 1620 Timed Code/Total Treatment Minutes:     35 NR  Next Progress Note due:  dc      Plan of Care Interventions:  [] Therapeutic Exercise  [] Modalities:  [] Therapeutic Activity     [] Ultrasound  [] Estim  [] Gait Training      [] Cervical Traction [] Lumbar Traction  [x] Neuromuscular Re-education    [] Cold/hotpack [] Iontophoresis   [x] Instruction in HEP      [] Vasopneumatic   [] Dry Needling    [] Manual Therapy               [] Aquatic Therapy              Electronically signed by:  Nidhi Reed 5/15/2019, 3:43 PM

## 2019-05-29 ENCOUNTER — HOSPITAL ENCOUNTER (OUTPATIENT)
Dept: PHYSICAL THERAPY | Age: 84
Setting detail: THERAPIES SERIES
Discharge: HOME | End: 2019-05-29
Payer: MEDICARE

## 2019-05-29 PROCEDURE — 97112 NEUROMUSCULAR REEDUCATION: CPT

## 2019-05-29 NOTE — FLOWSHEET NOTE
Outpatient Physical Therapy  [place]           [x] Phone: [phone]   Fax: [phone]  [place]           [] Phone: [phone]   Fax: [phone]        Physical Therapy Daily Treatment Note  Date:  2019    Patient Name:  Ara Pantoja    :  1935  MRN: [id number]  Restrictions/Precautions:    Diagnosis:   Diagnosis: BPPV  Date of Injury/Surgery: 2019  Treatment Diagnosis: Treatment Diagnosis: vestibular neuritis, decreased balance and tolerance with moving her head    Insurance/Certification information: PT Insurance Information: Medicare and [employer]   Referring Physician:  Referring Practitioner: Dr. Virginia Avila  Next Doctor Visit:    Plan of care signed (Y/N):    Outcome Measure: [address] [address]  Visit# / total visits:   / to 6  Pain level: 0/10   Goals:          Long term goals  Time Frame for Long term goals : 60 days  Long term goal 1: no vertigo  Long term goal 2: improve tolerance to moving head 10 reps comfortably  Long term goal 3: improve balance with walking  Long term goal 4: indep with home program    Summary of Evaluation: Assessment: Pt presents with  severe onset of vertigo and nausea 2019 and has gradually improved . She has low tolerance  with moving her head  and decreased balance. Today she tested negative for BPPV . Today she started with vestibular exs to improve her balance and tolerance for moving her head. No Medical equipment needs. Subjective:   She states she had pneumonia and vertigo returned  2 days -- needed Meclazine for 3 days . She states she has some difficulty with find a word. . Balance is better. Fatigue--less of a problem  Any changes in Ambulatory Summary Sheet?   None        Objective:  See eval           Exercises: (No more than 4 columns)   Exercise/Equipment Date19 Date 19  #2 Date  3/14/19 #3 19 #4   5/15/19 #5  19  #6     DHI  24         WARM UP                              Sitting         horiz head turns looking at 2 objects 6x , mild difficulty 12 x easy and steady 12 x easy 8x  easy     Diagonal 1, and 2  4 x very much increased her symptoms hea Able to advance to standing, and looking at object on floor and up 6 xeach,    2x each direction      Sitting touch knees, back up straight, touch top of socks , back up 2 cycles,  Very difficult  Standing 3 cycles and doing much better  2 cycles  difficult     Head steady and moving eyes horiz/ vertical/ diagonal   5x without glasses on  Each direction  4x horiz and diagonal did well                 STANDING         Ankle sways eyes open   Eyes closed each direction  10x each EO and EC,  Needs close guard, easily loses balance backwards 10x each with eyes open and eyes closed with only SBA each direction  10x each with eyes closed   5x eyes open  steady 10x eyes open   10x eyes open   Unable to do eyes closed 10x eyes open  10x eyes closed   Each direction on foam very difficult with eyes closed On foam 10 eyes open and closed and standing with diagonal pattern with eyes open   Walking with head rotation  30 sec  Mild dizziness     One finger on rail 10 ft and repeat    Mil Chi standing    4x  CW and CCW     Sewing   Mild problems but enjoy sewing and knitting  Difficult with vision and energy.    sta Doing well   90* turns      2 cycles  challenging    Safety issues Night light on, avoid looking up, use straw, shower safety, thicker pillow  compliant    Thinner soles recommeded     Nu step      Level 2   Seat 6  7 min  Level 2  Seat /arms 7  10 min    Stretch heel cords  Heel raises / toe raises     1 min  10x  10x  1 min  10x  10x   Gait training       Walk with feet 2 inches apart ,  Difficult    10min                                       MODALITIES                               Other Therapeutic Activities/Education:        Home Exercise Program:  above      Manual Treatments:        Modalities:        Communication with other providers:        Assessment: (Response towards treatment session)Pt recovers quickly after doing challenging exs. Overall less dizziness and no longer has nausea. Needs to work on walking with feet 2 inches apart. Assessment: Pt presents with  severe onset of vertigo and nausea Jan 24, 2019 and has gradually improved . She has low tolerance  with moving her head  and decreased balance. Today she tested negative for BPPV . Today she started with vestibular exs to improve her balance and tolerance for moving her head. No Medical equipment needs.       Plan for Next Session:  advance exs ie to standing      Time In / Time Out: 1000  to  1030  Timed Code/Total Treatment Minutes:     35 NR  Next Progress Note due:  dc      Plan of Care Interventions:  [] Therapeutic Exercise  [] Modalities:  [] Therapeutic Activity     [] Ultrasound  [] Estim  [] Gait Training      [] Cervical Traction [] Lumbar Traction  [x] Neuromuscular Re-education    [] Cold/hotpack [] Iontophoresis   [x] Instruction in HEP      [] Vasopneumatic   [] Dry Needling    [] Manual Therapy               [] Aquatic Therapy              Electronically signed by:  Heather Bruno 5/29/2019, 9:56 AM

## 2019-06-04 ENCOUNTER — HOSPITAL ENCOUNTER (OUTPATIENT)
Dept: PHYSICAL THERAPY | Age: 84
Discharge: HOME | End: 2019-06-04

## 2019-06-04 ENCOUNTER — HOSPITAL ENCOUNTER (OUTPATIENT)
Dept: ULTRASOUND IMAGING | Age: 84
Discharge: HOME | End: 2019-06-04
Payer: MEDICARE

## 2019-06-04 DIAGNOSIS — I80.9 THROMBOPHLEBITIS: ICD-10-CM

## 2019-06-04 PROCEDURE — 93971 EXTREMITY STUDY: CPT

## 2019-06-04 NOTE — FLOWSHEET NOTE
Physical Therapy  Cancellation/No-show Note  Patient Name:  Cathy Dozier  :  1935   Date:  2019  Cancelled visits to date: 0  No-shows to date: 0    For today's appointment patient:  [x]  Cancelled  []  Rescheduled appointment  []  No-show     Reason given by patient:  []  Patient ill  []  Conflicting appointment  []  No transportation    []  Conflict with work  []  No reason given  [x]  Other:     Comments:  Phlebitis going to doc    Electronically signed by:  Darryl Lynn,

## 2019-06-11 ENCOUNTER — OFFICE VISIT (OUTPATIENT)
Dept: INTERNAL MEDICINE CLINIC | Age: 84
End: 2019-06-11
Payer: MEDICARE

## 2019-06-11 VITALS
SYSTOLIC BLOOD PRESSURE: 168 MMHG | BODY MASS INDEX: 34.9 KG/M2 | OXYGEN SATURATION: 97 % | HEART RATE: 54 BPM | WEIGHT: 197 LBS | DIASTOLIC BLOOD PRESSURE: 90 MMHG

## 2019-06-11 DIAGNOSIS — E03.9 ACQUIRED HYPOTHYROIDISM: ICD-10-CM

## 2019-06-11 DIAGNOSIS — N18.30 TYPE 2 DIABETES MELLITUS WITH STAGE 3 CHRONIC KIDNEY DISEASE, WITHOUT LONG-TERM CURRENT USE OF INSULIN (HCC): Primary | ICD-10-CM

## 2019-06-11 DIAGNOSIS — N18.31 CHRONIC KIDNEY DISEASE (CKD) STAGE G3A/A1, MODERATELY DECREASED GLOMERULAR FILTRATION RATE (GFR) BETWEEN 45-59 ML/MIN/1.73 SQUARE METER AND ALBUMINURIA CREATININE RATIO LESS THAN 30 MG/G (HCC): ICD-10-CM

## 2019-06-11 DIAGNOSIS — I10 ESSENTIAL HYPERTENSION: ICD-10-CM

## 2019-06-11 DIAGNOSIS — Z78.0 POST-MENOPAUSAL: ICD-10-CM

## 2019-06-11 DIAGNOSIS — E78.2 MIXED HYPERLIPIDEMIA: ICD-10-CM

## 2019-06-11 DIAGNOSIS — G25.81 RESTLESS LEG SYNDROME: ICD-10-CM

## 2019-06-11 DIAGNOSIS — D64.9 ANEMIA, UNSPECIFIED TYPE: ICD-10-CM

## 2019-06-11 DIAGNOSIS — E66.09 NON MORBID OBESITY DUE TO EXCESS CALORIES: ICD-10-CM

## 2019-06-11 DIAGNOSIS — E11.22 TYPE 2 DIABETES MELLITUS WITH STAGE 3 CHRONIC KIDNEY DISEASE, WITHOUT LONG-TERM CURRENT USE OF INSULIN (HCC): Primary | ICD-10-CM

## 2019-06-11 PROCEDURE — 1036F TOBACCO NON-USER: CPT | Performed by: INTERNAL MEDICINE

## 2019-06-11 PROCEDURE — 1090F PRES/ABSN URINE INCON ASSESS: CPT | Performed by: INTERNAL MEDICINE

## 2019-06-11 PROCEDURE — G8399 PT W/DXA RESULTS DOCUMENT: HCPCS | Performed by: INTERNAL MEDICINE

## 2019-06-11 PROCEDURE — 4040F PNEUMOC VAC/ADMIN/RCVD: CPT | Performed by: INTERNAL MEDICINE

## 2019-06-11 PROCEDURE — G8427 DOCREV CUR MEDS BY ELIG CLIN: HCPCS | Performed by: INTERNAL MEDICINE

## 2019-06-11 PROCEDURE — G8417 CALC BMI ABV UP PARAM F/U: HCPCS | Performed by: INTERNAL MEDICINE

## 2019-06-11 PROCEDURE — 99214 OFFICE O/P EST MOD 30 MIN: CPT | Performed by: INTERNAL MEDICINE

## 2019-06-11 PROCEDURE — 1123F ACP DISCUSS/DSCN MKR DOCD: CPT | Performed by: INTERNAL MEDICINE

## 2019-06-11 NOTE — PROGRESS NOTES
Name: Blayne Cotton  [id number]  Age: 81 y.o. YOB: 1935  Sex: female    CHIEF COMPLAINT:    Chief Complaint   Patient presents with    3 Month Follow-Up     Hypertension, diabetes, hyperlipidemia, chronic kidney disease, hypothyroidism, RLS, colon polyps       HISTORY OF PRESENT ILLNESS:     This is a pleasant  81 y.o. female who is here for management of the chronic medical problems. Patient has no spontaneous somatic complaints. Diabetes is well controlled on diet. Home glucose log reviewed. Blood glucose is running in the 110s to 120s with occasional 132. She is off metformin. Hypertension is not optimally controlled at home; it is running between high 130s to 160s. Norvasc was held because her blood pressure was running low after pneumonia. Will restart Norvasc. She is obese with a BMI of 34    Hyperlipidemia not optimally controlled. She did not tolerate Lipitor and Zocor. She is tolerating Lescol. Chronic kidney disease is stable. Hypothyroidism is well managed with Synthroid 125 MCG    Restless leg syndrome is stable with Requip    Patient has multiple colon polyps and a follow-up is advised in 6 months.   is accompanying the patient. Previous records reviewed via [employer] with the patient.     Past Medical History:        Diagnosis Date    #[id number] In Past     Skin Cancer Excised Face, Right Shoulder In Past    Abnormal ECG 10/5/2017    Acid reflux     Arthritis     \"Hands\"    CKD (chronic kidney disease), stage III (HCC)     Sees Dr. Ian Guardado Diabetes mellitus [facility]) Dx 1990's    \"Pre Diabetic\"    Essential hypertension 3/5/2017    Heart palpitations     Sees Dr. Carol Balderas History of nuclear stress test 10/12/2017    cardiolite-EF69%,small lateral wall ischemia    [place] (hard of hearing)     Bilateral Hearing Aids    Holter monitor, abnormal 10/05/2017    Hx of Doppler echocardiogram 10/11/2017    EF50-60%,increased LVOT and AOV Relation Age of Onset    Heart Disease Mother     Breast Cancer Mother     Heart Disease Father     Diabetes Father     High Blood Pressure Father     Cancer Father     Stroke Father        Allergies:  Codeine; Lactose intolerance (gi); Lipitor [atorvastatin]; Penicillins; Phenobarbital; Sulfa antibiotics; Tape [adhesive tape]; Vancomycin; and Zocor [simvastatin]    Current Medications :      Prior to Admission medications    Medication Sig Start Date End Date Taking? Authorizing Provider   fluvastatin (LESCOL XL) 80 MG extended release tablet TAKE 1 TABLET EVERY DAY 5/15/19  Yes Liliya Garcia MD   ranitidine (ZANTAC) 150 MG tablet Take 1 tablet by mouth 2 times daily 5/15/19  Yes Liliya Garcia MD   levothyroxine (SYNTHROID) 137 MCG tablet Take 1 tablet by mouth daily 5/8/19  Yes Liliya Garcia MD   montelukast (SINGULAIR) 10 MG tablet Take 1 tablet by mouth daily 4/9/19  Yes Liliya Garcia MD   metoprolol tartrate (LOPRESSOR) 25 MG tablet TAKE 1 TABLET TWICE DAILY 4/3/19  Yes Liliya Garcia MD   magnesium oxide (MAG-OX) 400 (241.3 Mg) MG TABS tablet TAKE 1 TABLET EVERY DAY 1/17/19  Yes Jassi Kramer MD   blood glucose monitor strips To check blood sugar twice daily with current Glucometer:   DX: diabetes type .00 11/27/18  Yes Liliya Garcia MD   Lancets MISC To test twice daily  Dx: E 11.9 11/6/18  Yes Liliya Garcia MD   hydrocortisone 2.5 % cream Apply topically 2 times daily.  11/6/18  Yes Liliya Garcia MD   potassium chloride (KLOR-CON M) 10 MEQ extended release tablet Take 1 tablet by mouth daily 10/18/18  Yes Jassi Kramer MD   pantoprazole (PROTONIX) 40 MG tablet Take 40 mg by mouth nightly   Yes Historical Provider, MD   Multiple Vitamins-Minerals (MULTIVITAMIN PO) Take by mouth nightly Over The Counter   Yes Historical Provider, MD   Calcium Carb-Cholecalciferol (CALCIUM + D3 PO) Take by mouth 2 times daily Over The Counter   Yes Historical Provider, MD no bipedal edema  NEURO:  Alert, oriented to person, place and time. Grossly intact. Normal recent memory and remote memory OK. Normal fluency and comprehension. Normal awareness of current events. Gait normal. No ataxia. PSYCH:  Mood euthymic, insight and judgement good, no SI or HI.         ASSESSMENT/PLAN:    Vivian Green was seen today for 3 month follow-up. Diagnoses and all orders for this visit:    Type 2 diabetes mellitus with stage 3 chronic kidney disease, without long-term current use of insulin (HCC)  Stable. Continue current management. Continue monitoring blood glucose. 6  -     Hemoglobin A1C; Future    Chronic kidney disease (CKD) stage G3a/A1, moderately decreased glomerular filtration rate (GFR) between 45-59 mL/min/1.73 square meter and albuminuria creatinine ratio less than 30 mg/g ([facility])  Stable. Stay well hydrated. Avoid nephrotoxins like NSAIDs. Mixed hyperlipidemia  Not optimally controlled. Could not tolerate Lipitor or Zocor. She is tolerating Lescol; will continue. Low-fat diet. Regular exercise. -     LDL Cholesterol, Direct; Future    Essential hypertension  Elevated. Restart Norvasc. Monitor blood pressure at home as instructed. -     Basic Metabolic Panel; Future    Anemia, unspecified type  -     CBC; Future    Post-menopausal  -     DEXA Bone Density Axial Skeleton; Future    Non morbid obesity due to excess calories  Advised to lose weight by reducing calorie intake and exercise as tolerated. Acquired hypothyroidism  Stable. Continue Synthroid. Restless leg syndrome  Stable. Continue current management. Health Maintenance Due   Topic Date Due    Shingles Vaccine (2 of 3) 11/07/2014    Pneumococcal 65+ years Vaccine (2 of 2 - PPSV23) 02/20/2019     . Care discussed with patient. Questions answered and patient verbalizes understanding and agrees with plan. Medications reviewed and reconciled. Continue current medications.   Appropriate

## 2019-06-12 ENCOUNTER — HOSPITAL ENCOUNTER (OUTPATIENT)
Dept: PHYSICAL THERAPY | Age: 84
Setting detail: THERAPIES SERIES
Discharge: HOME | End: 2019-06-12
Payer: MEDICARE

## 2019-06-12 PROCEDURE — 97112 NEUROMUSCULAR REEDUCATION: CPT

## 2019-06-12 NOTE — FLOWSHEET NOTE
Outpatient Physical Therapy  [place]           [x] Phone: [phone]   Fax: [phone]  [place]           [] Phone: [phone]   Fax: [phone]        Physical Therapy Daily Treatment Note  Date:  2019    Patient Name:  Dionne Robledo    :  1935  MRN: [id number]  Restrictions/Precautions:    Diagnosis:   Diagnosis: BPPV  Date of Injury/Surgery: 2019  Treatment Diagnosis: Treatment Diagnosis: vestibular neuritis, decreased balance and tolerance with moving her head    Insurance/Certification information: PT Insurance Information: Medicare and Savita Parrish [employer]   Referring Physician:  Referring Practitioner: Dr. Zafar Mccoy  Next Doctor Visit:    Plan of care signed (Y/N):    Outcome Measure: [address] [address]  Visit# / total visits:   / to 6  Pain level: 0/10   Goals:          Long term goals  Time Frame for Long term goals : 60 days  Long term goal 1: no vertigo  Long term goal 2: improve tolerance to moving head 10 reps comfortably  Long term goal 3: improve balance with walking  Long term goal 4: indep with home program    Summary of Evaluation: Assessment: Pt presents with  severe onset of vertigo and nausea 2019 and has gradually improved . She has low tolerance  with moving her head  and decreased balance. Today she tested negative for BPPV . Today she started with vestibular exs to improve her balance and tolerance for moving her head. No Medical equipment needs. Subjective:   She states has phlebitis last week , treated with aspirin, heat, elevating leg. Leg is improving. No pain today  Any changes in Ambulatory Summary Sheet?   None        Objective:  See eval           Exercises: (No more than 4 columns)   Exercise/Equipment Date19 Date 19  #2 Date  3/14/19 #3 19 #4   5/15/19 #5  19  #6   19  #7     DHI  24          WARM UP                                 Sitting          horiz head turns looking at 2 objects 6x , mild difficulty 12 x easy and steady 12 x easy 8x  easy   10 x easy,  No need to do anymore    Diagonal 1, and 2  4 x very much increased her symptoms hea Able to advance to standing, and looking at object on floor and up 6 xeach,    2x each direction       Sitting touch knees, back up straight, touch top of socks , back up 2 cycles,  Very difficult  Standing 3 cycles and doing much better  2 cycles  difficult      Head steady and moving eyes horiz/ vertical/ diagonal   5x without glasses on  Each direction  4x horiz and diagonal did well                   STANDING          Ankle sways eyes open   Eyes closed each direction  10x each EO and EC,  Needs close guard, easily loses balance backwards 10x each with eyes open and eyes closed with only SBA each direction  10x each with eyes closed   5x eyes open  steady 10x eyes open   10x eyes open   Unable to do eyes closed 10x eyes open  10x eyes closed   Each direction on foam very difficult with eyes closed On foam 10 eyes open and closed and standing with diagonal pattern with eyes open    Walking with head rotation  30 sec  Mild dizziness     One finger on rail 10 ft and repeat  One finger on rail 2x 10 ft   Mil Chi standing    4x  CW and CCW      Sewing   Mild problems but enjoy sewing and knitting  Difficult with vision and energy.    sta Doing well    90* turns      2 cycles  challenging  2 cycles, steadier   Safety issues Night light on, avoid looking up, use straw, shower safety, thicker pillow  compliant    Thinner soles recommeded B1 vit to discuss with PCP to see if this helps      Nu step      Level 2   Seat 6  7 min  Level 2  Seat /arms 7  10 min  Hold due to phlebitis   Stretch heel cords  Heel raises / toe raises     1 min  10x  10x  1 min  10x  10x  10x   10x    Gait training       Walk with feet 2 inches apart ,  Difficult    10min 8 min practice                                            MODALITIES                                  Other Therapeutic Activities/Education: Home Exercise Program:  above      Manual Treatments:        Modalities:        Communication with other providers:        Assessment:  (Response towards treatment session)Pt recovers quickly after doing challenging exs. Overall less dizziness and no longer has nausea. Needs to work on walking with feet 2 inches apart. Assessment: Pt presents with  severe onset of vertigo and nausea Jan 24, 2019 and has gradually improved . She has low tolerance  with moving her head  and decreased balance. Today she tested negative for BPPV . Today she started with vestibular exs to improve her balance and tolerance for moving her head. No Medical equipment needs.       Plan for Next Session:  advance exs ie to standing      Time In / Time Out: 1030 to  1100 Timed Code/Total Treatment Minutes:     30NR  Next Progress Note due:  dc      Plan of Care Interventions:  [] Therapeutic Exercise  [] Modalities:  [] Therapeutic Activity     [] Ultrasound  [] Estim  [] Gait Training      [] Cervical Traction [] Lumbar Traction  [x] Neuromuscular Re-education    [] Cold/hotpack [] Iontophoresis   [x] Instruction in HEP      [] Vasopneumatic   [] Dry Needling    [] Manual Therapy               [] Aquatic Therapy              Electronically signed by:  Ani Mondragon 6/12/2019, 10:38 AM

## 2019-06-13 ENCOUNTER — HOSPITAL ENCOUNTER (OUTPATIENT)
Age: 84
Setting detail: SPECIMEN
Discharge: HOME | End: 2019-06-13
Payer: MEDICARE

## 2019-06-13 LAB
ANION GAP SERPL CALCULATED.3IONS-SCNC: 9 MMOL/L (ref 4–16)
BUN BLDV-MCNC: 25 MG/DL (ref 6–23)
CALCIUM SERPL-MCNC: 10.2 MG/DL (ref 8.3–10.6)
CHLORIDE BLD-SCNC: 106 MMOL/L (ref 99–110)
CO2: 25 MMOL/L (ref 21–32)
CREAT SERPL-MCNC: 1.3 MG/DL (ref 0.6–1.1)
ESTIMATED AVERAGE GLUCOSE: 114 MG/DL
GFR AFRICAN AMERICAN: 47 ML/MIN/1.73M2
GFR NON-AFRICAN AMERICAN: 39 ML/MIN/1.73M2
GLUCOSE BLD-MCNC: 99 MG/DL (ref 70–99)
HBA1C MFR BLD: 5.6 % (ref 4.2–6.3)
HCT VFR BLD CALC: 44.2 % (ref 37–47)
HEMOGLOBIN: 14.2 GM/DL (ref 12.5–16)
LDL CHOLESTEROL DIRECT: 116 MG/DL
MCH RBC QN AUTO: 28.5 PG (ref 27–31)
MCHC RBC AUTO-ENTMCNC: 32.1 % (ref 32–36)
MCV RBC AUTO: 88.6 FL (ref 78–100)
PDW BLD-RTO: 12.5 % (ref 11.7–14.9)
PLATELET # BLD: 212 K/CU MM (ref 140–440)
PMV BLD AUTO: 11.3 FL (ref 7.5–11.1)
POTASSIUM SERPL-SCNC: 4.5 MMOL/L (ref 3.5–5.1)
RBC # BLD: 4.99 M/CU MM (ref 4.2–5.4)
SODIUM BLD-SCNC: 140 MMOL/L (ref 135–145)
WBC # BLD: 6.9 K/CU MM (ref 4–10.5)

## 2019-06-13 PROCEDURE — 80048 BASIC METABOLIC PNL TOTAL CA: CPT

## 2019-06-13 PROCEDURE — 36415 COLL VENOUS BLD VENIPUNCTURE: CPT

## 2019-06-13 PROCEDURE — 83721 ASSAY OF BLOOD LIPOPROTEIN: CPT

## 2019-06-13 PROCEDURE — 83036 HEMOGLOBIN GLYCOSYLATED A1C: CPT

## 2019-06-13 PROCEDURE — 85027 COMPLETE CBC AUTOMATED: CPT

## 2019-06-14 ENCOUNTER — HOSPITAL ENCOUNTER (OUTPATIENT)
Dept: WOMENS IMAGING | Age: 84
Discharge: HOME | End: 2019-06-14
Payer: MEDICARE

## 2019-06-14 DIAGNOSIS — Z78.0 POST-MENOPAUSAL: ICD-10-CM

## 2019-06-14 PROCEDURE — 77080 DXA BONE DENSITY AXIAL: CPT

## 2019-06-26 ENCOUNTER — HOSPITAL ENCOUNTER (OUTPATIENT)
Dept: PHYSICAL THERAPY | Age: 84
Setting detail: THERAPIES SERIES
Discharge: HOME | End: 2019-06-26
Payer: MEDICARE

## 2019-06-26 PROCEDURE — 97112 NEUROMUSCULAR REEDUCATION: CPT

## 2019-06-26 NOTE — FLOWSHEET NOTE
better, steadier    Slow marches         20x hold 3 sec    Toe taps         12x one finger                          MODALITIES                                     Other Therapeutic Activities/Education:        Home Exercise Program:  above      Manual Treatments:        Modalities:        Communication with other providers:        Assessment:  (Response towards treatment session)Pt recovers quickly after doing challenging exs. Overall less dizziness and no longer has nausea. Needs to work on walking with feet 2 inches apart. Assessment: Pt presents with  severe onset of vertigo and nausea Jan 24, 2019 and has gradually improved . She has low tolerance  with moving her head  and decreased balance. Today she tested negative for BPPV . Today she started with vestibular exs to improve her balance and tolerance for moving her head. No Medical equipment needs.       Plan for Next Session:  advance exs ie to standing      Time In / Time Out: 1030 to  1100 Timed Code/Total Treatment Minutes:     30NR  Next Progress Note due:  dc      Plan of Care Interventions:  [] Therapeutic Exercise  [] Modalities:  [] Therapeutic Activity     [] Ultrasound  [] Estim  [] Gait Training      [] Cervical Traction [] Lumbar Traction  [x] Neuromuscular Re-education    [] Cold/hotpack [] Iontophoresis   [x] Instruction in HEP      [] Vasopneumatic   [] Dry Needling    [] Manual Therapy               [] Aquatic Therapy              Electronically signed by:  Elizabeth Adair 6/26/2019, 11:18 AM

## 2019-07-17 ENCOUNTER — HOSPITAL ENCOUNTER (OUTPATIENT)
Dept: PHYSICAL THERAPY | Age: 84
Setting detail: THERAPIES SERIES
Discharge: HOME | End: 2019-07-17
Payer: MEDICARE

## 2019-07-17 PROCEDURE — 97112 NEUROMUSCULAR REEDUCATION: CPT

## 2019-07-17 NOTE — FLOWSHEET NOTE
Sitting            horiz head turns looking at 2 objects 6x , mild difficulty 12 x easy and steady 12 x easy 8x  easy   10 x easy,  No need to do anymore      Diagonal 1, and 2  4 x very much increased her symptoms hea Able to advance to standing, and looking at object on floor and up 6 xeach,    2x each direction         Sitting touch knees, back up straight, touch top of socks , back up 2 cycles,  Very difficult  Standing 3 cycles and doing much better  2 cycles  difficult        Head steady and moving eyes horiz/ vertical/ diagonal   5x without glasses on  Each direction  4x horiz and diagonal did well                       STANDING            Ankle sways eyes open   Eyes closed each direction  10x each EO and EC,  Needs close guard, easily loses balance backwards 10x each with eyes open and eyes closed with only SBA each direction  10x each with eyes closed   5x eyes open  steady 10x eyes open   10x eyes open   Unable to do eyes closed 10x eyes open  10x eyes closed   Each direction on foam very difficult with eyes closed On foam 10 eyes open and closed and standing with diagonal pattern with eyes open   10x   10x    Walking with head rotation  30 sec  Mild dizziness     One finger on rail 10 ft and repeat  One finger on rail 2x 10 ft One finger with PT  2x 70 ft. Mil Chi standing    4x  CW and CCW        Sewing   Mild problems but enjoy sewing and knitting  Difficult with vision and energy.    sta Doing well      90* turns      2 cycles  challenging  2 cycles, steadier  4 cycles , steadier    Safety issues Night light on, avoid looking up, use straw, shower safety, thicker pillow  compliant    Thinner soles recommeded B1 vit to discuss with PCP to see if this helps  reviewed      Nu step      Level 2   Seat 6  7 min  Level 2  Seat /arms 7  10 min  Hold due to phlebitis     Stretch heel cords  Heel raises / toe raises     1 min  10x  10x  1 min  10x  10x  10x   10x  10x 10x stretch 2 min 10x  10x

## 2019-07-23 ENCOUNTER — OFFICE VISIT (OUTPATIENT)
Dept: INTERNAL MEDICINE CLINIC | Age: 84
End: 2019-07-23
Payer: MEDICARE

## 2019-07-23 VITALS
OXYGEN SATURATION: 96 % | WEIGHT: 201 LBS | BODY MASS INDEX: 35.61 KG/M2 | HEART RATE: 55 BPM | SYSTOLIC BLOOD PRESSURE: 128 MMHG | DIASTOLIC BLOOD PRESSURE: 76 MMHG

## 2019-07-23 DIAGNOSIS — E78.2 MIXED HYPERLIPIDEMIA: ICD-10-CM

## 2019-07-23 DIAGNOSIS — M85.80 OSTEOPENIA, UNSPECIFIED LOCATION: ICD-10-CM

## 2019-07-23 DIAGNOSIS — I10 ESSENTIAL HYPERTENSION: Primary | ICD-10-CM

## 2019-07-23 PROCEDURE — 99213 OFFICE O/P EST LOW 20 MIN: CPT | Performed by: INTERNAL MEDICINE

## 2019-07-23 PROCEDURE — G8399 PT W/DXA RESULTS DOCUMENT: HCPCS | Performed by: INTERNAL MEDICINE

## 2019-07-23 PROCEDURE — 1090F PRES/ABSN URINE INCON ASSESS: CPT | Performed by: INTERNAL MEDICINE

## 2019-07-23 PROCEDURE — G8427 DOCREV CUR MEDS BY ELIG CLIN: HCPCS | Performed by: INTERNAL MEDICINE

## 2019-07-23 PROCEDURE — G8417 CALC BMI ABV UP PARAM F/U: HCPCS | Performed by: INTERNAL MEDICINE

## 2019-07-23 PROCEDURE — 1036F TOBACCO NON-USER: CPT | Performed by: INTERNAL MEDICINE

## 2019-07-23 PROCEDURE — 1123F ACP DISCUSS/DSCN MKR DOCD: CPT | Performed by: INTERNAL MEDICINE

## 2019-07-23 PROCEDURE — 4040F PNEUMOC VAC/ADMIN/RCVD: CPT | Performed by: INTERNAL MEDICINE

## 2019-07-23 RX ORDER — EZETIMIBE 10 MG/1
10 TABLET ORAL DAILY
Qty: 30 TABLET | Refills: 3 | Status: SHIPPED | OUTPATIENT
Start: 2019-07-23 | End: 2020-02-06

## 2019-07-23 NOTE — PROGRESS NOTES
Name: Wander Acevedo  [id number]  Age: 81 y.o. YOB: 1935  Sex: female    CHIEF COMPLAINT:    Chief Complaint   Patient presents with    1 Month Follow-Up     Hypertension, Hyperlipidemia     Results     Labs and DEXA scan       HISTORY OF PRESENT ILLNESS:     This is a pleasant  81 y.o. female who is here for management of the chronic medical problems. Patient has no spontaneous somatic complaints. Labs 6/13/19 showed normal electrolytes and stable renal function with creatinine 1.3. Hemoglobin A1c is 5.6  CBC is unremarkable. Blood pressure is well controlled now. Patient was started on Norvasc. Patient denies any chest pain, palpitation or shortness of breath. LDL is 116. She is taking Lescol. Denies any muscle aches, weakness. DEXA is showing osteopenia      is accompanying the patient. Previous records reviewed via [employer] with the patient.     Past Medical History:        Diagnosis Date    #[id number] In Past     Skin Cancer Excised Face, Right Shoulder In Past    Abnormal ECG 10/5/2017    Acid reflux     Arthritis     \"Hands\"    CKD (chronic kidney disease), stage III (HCC)     Sees Dr. Ester Smith Diabetes mellitus [facility]) Dx 1990's    \"Pre Diabetic\"    Essential hypertension 3/5/2017    Heart palpitations     Sees Dr. Jessee Wick History of nuclear stress test 10/12/2017    cardiolite-EF69%,small lateral wall ischemia    [place] (hard of hearing)     Bilateral Hearing Aids    Holter monitor, abnormal 10/05/2017    Hx of Doppler echocardiogram 10/11/2017    EF50-60%,increased LVOT and AOV    Hx of echocardiogram 10/06/2016    EF54% Mild to mod MR, See media    Hyperlipidemia 3/5/2017    Lactose intolerance     Motion sickness     PONV (postoperative nausea and vomiting)     PSVT (paroxysmal supraventricular tachycardia) ([facility] [address] 11/28/2018    10/2017 monitor 150 bpm PSVT    Restless leg syndrome     Shortness of breath on exertion     REBECA (stress urinary incontinence, female)     Thyroid disease     Type 2 diabetes mellitus with stage 3 chronic kidney disease, without long-term current use of insulin ([facility] [address] 7/6/2017    Wears glasses     Wears hearing aid     Bilateral        Past Surgical History:        Procedure Laterality Date    BLEPHAROPLASTY Bilateral 08/24/2009    CARDIAC CATHETERIZATION  2005    \"Normal\"    CARPAL TUNNEL RELEASE  09/12/2018    CHOLECYSTECTOMY  1980's    \"They Also  Removed An Ovary, Not Sure Which Side\"    COLONOSCOPY  04/2014    HX: polyps    COLONOSCOPY  08/21/2018    internal grade 1 hemorrhoids, severe diverticulosis found in sigmoid colon and descending colon, three 4mm and 2mm sessile polyps found in rectum, two 3mm polyps found in mid ascending colon, two 7mm sessile polyps in descending colon, single 2 mm polyp in splenic flexure, 4 mm polyp found in colon    DILATION AND CURETTAGE OF UTERUS      \"At Least 3 Prior To LATRICIA In 1972\"    EYE SURGERY Bilateral 2011    Cataracts With Lens Implants    EYE SURGERY Left 1999    \"Tear In The Retina\"    HYSTERECTOMY, TOTAL ABDOMINAL  1972    JOINT REPLACEMENT Right 09/26/2007    Total Right Knee    JOINT REPLACEMENT Left 11/06/2007    Total Left Knee    PERIPHERAL PERCUTANEOUS VENOUS INTERVENTION      Dr Macdonald Living  In Past    Face, Right Shoulder       Social History:   Social History     Tobacco Use    Smoking status: Never Smoker    Smokeless tobacco: Never Used   Substance Use Topics    Alcohol use: Yes     Comment: \"Maybe 3 Times A Year\"       Family History:       Problem Relation Age of Onset    Heart Disease Mother     Breast Cancer Mother     Heart Disease Father     Diabetes Father     High Blood Pressure Father     Cancer Father     Stroke Father        Allergies:  Codeine; Lactose intolerance (gi); Lipitor [atorvastatin]; Penicillins; Phenobarbital; Sulfa antibiotics; Tape [adhesive tape];  Vancomycin; and Zocor Provider, MD   Omega-3 Fatty Acids (FISH OIL PO) Take 1,000 mg by mouth nightly Over The Counter    Yes Historical Provider, MD   Docusate Calcium (STOOL SOFTENER PO) Take by mouth nightly Over The Counter   Yes Historical Provider, MD   Cholecalciferol (VITAMIN D3) 2000 units CAPS Take by mouth nightly Over The Counter   Yes Historical Provider, MD   rOPINIRole (REQUIP) 0.5 MG tablet Take 2 tablets by mouth nightly 2/6/18  Yes Madeline Cortez MD   L-Arginine 500 MG CAPS Take 500 mg by mouth nightly Over The Counter   Yes Historical Provider, MD   aspirin 81 MG tablet Take 81 mg by mouth nightly Over The Counter   Yes Historical Provider, MD   albuterol sulfate  (90 Base) MCG/ACT inhaler Inhale 2 puffs into the lungs every 6 hours as needed for Wheezing or Shortness of Breath 4/9/19 5/9/19  Madelnie Cortez MD       LAB DATA: Reviewed. REVIEW OF SYSTEMS:   see HPI    PHYSICAL EXAMINATION:   /76 (Site: Left Upper Arm, Position: Sitting, Cuff Size: Medium Adult)   Pulse 55   Wt 201 lb (91.2 kg)   SpO2 96%   BMI 35.61 kg/m²      GENERAL APPEARANCE:    Alert, oriented x 3, well developed, cooperative, not in any distress, appears stated age. HEAD:   Normocephalic, atraumatic   EYES:   PERRLA, EOMI, lids normal, conjuctivea clear, sclera anicteric. NECK:    Supple, symmetrical,  trachea midline, no thyromegaly, no JVD, no lymphadenopathy. LUNGS:    Clear to auscultation bilaterally, respirations unlabored, accessory muscles are not used. HEART:     Regular rate and rhythm, S1 and S2 normal, no murmur, rub or gallop. PMI in MCL. ABDOMEN:    Soft, non-tender, bowel sounds are normoactive, no masses, no hepatospleenomegaly. EXTREMITY:   no bipedal edema  PSYCH:  Mood euthymic, insight and judgement good, no SI or HI.         ASSESSMENT/PLAN:    Gretchen Ibarra was seen today for 1 month follow-up and results. Diagnoses and all orders for this visit:     Essential hypertension  Well-controlled.

## 2019-08-20 DIAGNOSIS — K21.9 GASTROESOPHAGEAL REFLUX DISEASE, ESOPHAGITIS PRESENCE NOT SPECIFIED: ICD-10-CM

## 2019-08-20 DIAGNOSIS — E03.9 ACQUIRED HYPOTHYROIDISM: ICD-10-CM

## 2019-08-20 DIAGNOSIS — I10 ESSENTIAL HYPERTENSION: ICD-10-CM

## 2019-08-20 RX ORDER — LEVOTHYROXINE SODIUM 137 UG/1
137 TABLET ORAL DAILY
Qty: 90 TABLET | Refills: 1 | Status: SHIPPED | OUTPATIENT
Start: 2019-08-20 | End: 2020-02-18 | Stop reason: SDUPTHER

## 2019-08-20 RX ORDER — FLUVASTATIN SODIUM 80 MG/1
TABLET, FILM COATED, EXTENDED RELEASE ORAL
Qty: 90 TABLET | Refills: 1 | Status: SHIPPED | OUTPATIENT
Start: 2019-08-20 | End: 2020-02-18 | Stop reason: SDUPTHER

## 2019-08-20 RX ORDER — POTASSIUM CHLORIDE 750 MG/1
10 TABLET, EXTENDED RELEASE ORAL DAILY
Qty: 90 TABLET | Refills: 1 | Status: SHIPPED | OUTPATIENT
Start: 2019-08-20 | End: 2020-01-14 | Stop reason: SDUPTHER

## 2019-08-20 RX ORDER — RANITIDINE 150 MG/1
150 TABLET ORAL 2 TIMES DAILY
Qty: 180 TABLET | Refills: 1 | Status: SHIPPED | OUTPATIENT
Start: 2019-08-20 | End: 2019-12-02

## 2019-10-07 ENCOUNTER — HOSPITAL ENCOUNTER (OUTPATIENT)
Age: 84
Discharge: HOME | End: 2019-10-07
Payer: MEDICARE

## 2019-10-07 ENCOUNTER — TELEPHONE (OUTPATIENT)
Dept: INTERNAL MEDICINE CLINIC | Age: 84
End: 2019-10-07

## 2019-10-07 ENCOUNTER — OFFICE VISIT (OUTPATIENT)
Dept: INTERNAL MEDICINE CLINIC | Age: 84
End: 2019-10-07
Payer: MEDICARE

## 2019-10-07 ENCOUNTER — HOSPITAL ENCOUNTER (OUTPATIENT)
Dept: GENERAL RADIOLOGY | Age: 84
Discharge: HOME | End: 2019-10-07
Payer: MEDICARE

## 2019-10-07 VITALS
OXYGEN SATURATION: 98 % | WEIGHT: 203.8 LBS | DIASTOLIC BLOOD PRESSURE: 70 MMHG | HEART RATE: 58 BPM | BODY MASS INDEX: 36.1 KG/M2 | SYSTOLIC BLOOD PRESSURE: 120 MMHG

## 2019-10-07 DIAGNOSIS — Z23 NEEDS FLU SHOT: ICD-10-CM

## 2019-10-07 DIAGNOSIS — K21.9 GASTROESOPHAGEAL REFLUX DISEASE, ESOPHAGITIS PRESENCE NOT SPECIFIED: ICD-10-CM

## 2019-10-07 DIAGNOSIS — I83.893 VARICOSE VEINS OF BOTH LEGS WITH EDEMA: ICD-10-CM

## 2019-10-07 DIAGNOSIS — I10 ESSENTIAL HYPERTENSION: ICD-10-CM

## 2019-10-07 DIAGNOSIS — M85.89 OSTEOPENIA OF MULTIPLE SITES: ICD-10-CM

## 2019-10-07 DIAGNOSIS — M79.671 RIGHT FOOT PAIN: ICD-10-CM

## 2019-10-07 DIAGNOSIS — E03.9 ACQUIRED HYPOTHYROIDISM: ICD-10-CM

## 2019-10-07 DIAGNOSIS — E78.2 MIXED HYPERLIPIDEMIA: ICD-10-CM

## 2019-10-07 DIAGNOSIS — N18.30 CKD (CHRONIC KIDNEY DISEASE), STAGE III (HCC): ICD-10-CM

## 2019-10-07 DIAGNOSIS — E87.6 HYPOKALEMIA: ICD-10-CM

## 2019-10-07 DIAGNOSIS — G25.81 RESTLESS LEG SYNDROME: ICD-10-CM

## 2019-10-07 DIAGNOSIS — D12.6 ADENOMATOUS POLYP OF COLON, UNSPECIFIED PART OF COLON: Primary | ICD-10-CM

## 2019-10-07 DIAGNOSIS — Z23 NEED FOR PROPHYLACTIC VACCINATION AGAINST STREPTOCOCCUS PNEUMONIAE (PNEUMOCOCCUS): ICD-10-CM

## 2019-10-07 DIAGNOSIS — Z23 NEED FOR PROPHYLACTIC VACCINATION AND INOCULATION AGAINST VARICELLA: ICD-10-CM

## 2019-10-07 DIAGNOSIS — R25.2 MUSCLE CRAMPS AT NIGHT: ICD-10-CM

## 2019-10-07 DIAGNOSIS — J45.20 MILD INTERMITTENT ASTHMA, UNSPECIFIED WHETHER COMPLICATED: ICD-10-CM

## 2019-10-07 PROCEDURE — 1123F ACP DISCUSS/DSCN MKR DOCD: CPT | Performed by: INTERNAL MEDICINE

## 2019-10-07 PROCEDURE — 1036F TOBACCO NON-USER: CPT | Performed by: INTERNAL MEDICINE

## 2019-10-07 PROCEDURE — 4040F PNEUMOC VAC/ADMIN/RCVD: CPT | Performed by: INTERNAL MEDICINE

## 2019-10-07 PROCEDURE — G0008 ADMIN INFLUENZA VIRUS VAC: HCPCS | Performed by: INTERNAL MEDICINE

## 2019-10-07 PROCEDURE — 90653 IIV ADJUVANT VACCINE IM: CPT | Performed by: INTERNAL MEDICINE

## 2019-10-07 PROCEDURE — G8427 DOCREV CUR MEDS BY ELIG CLIN: HCPCS | Performed by: INTERNAL MEDICINE

## 2019-10-07 PROCEDURE — G8417 CALC BMI ABV UP PARAM F/U: HCPCS | Performed by: INTERNAL MEDICINE

## 2019-10-07 PROCEDURE — 99214 OFFICE O/P EST MOD 30 MIN: CPT | Performed by: INTERNAL MEDICINE

## 2019-10-07 PROCEDURE — 73630 X-RAY EXAM OF FOOT: CPT

## 2019-10-07 PROCEDURE — G8399 PT W/DXA RESULTS DOCUMENT: HCPCS | Performed by: INTERNAL MEDICINE

## 2019-10-07 PROCEDURE — G8482 FLU IMMUNIZE ORDER/ADMIN: HCPCS | Performed by: INTERNAL MEDICINE

## 2019-10-07 PROCEDURE — 1090F PRES/ABSN URINE INCON ASSESS: CPT | Performed by: INTERNAL MEDICINE

## 2019-10-07 RX ORDER — MECLIZINE HYDROCHLORIDE 25 MG/1
25 TABLET ORAL 3 TIMES DAILY PRN
Qty: 90 TABLET | Refills: 0 | Status: SHIPPED | OUTPATIENT
Start: 2019-10-07 | End: 2019-11-06

## 2019-10-07 RX ORDER — POTASSIUM CHLORIDE 750 MG/1
1 TABLET, FILM COATED, EXTENDED RELEASE ORAL DAILY
Refills: 3 | COMMUNITY
Start: 2019-07-20 | End: 2019-10-07

## 2019-12-04 ENCOUNTER — OFFICE VISIT (OUTPATIENT)
Dept: CARDIOLOGY CLINIC | Age: 84
End: 2019-12-04
Payer: MEDICARE

## 2019-12-04 VITALS
HEART RATE: 55 BPM | BODY MASS INDEX: 36.44 KG/M2 | HEIGHT: 62 IN | WEIGHT: 198 LBS | DIASTOLIC BLOOD PRESSURE: 70 MMHG | SYSTOLIC BLOOD PRESSURE: 124 MMHG

## 2019-12-04 DIAGNOSIS — E11.22 TYPE 2 DIABETES MELLITUS WITH STAGE 3 CHRONIC KIDNEY DISEASE, WITHOUT LONG-TERM CURRENT USE OF INSULIN (HCC): ICD-10-CM

## 2019-12-04 DIAGNOSIS — I10 ESSENTIAL HYPERTENSION: ICD-10-CM

## 2019-12-04 DIAGNOSIS — I38 VHD (VALVULAR HEART DISEASE): Primary | ICD-10-CM

## 2019-12-04 DIAGNOSIS — I49.9 IRREGULAR HEART BEAT: ICD-10-CM

## 2019-12-04 DIAGNOSIS — N18.30 TYPE 2 DIABETES MELLITUS WITH STAGE 3 CHRONIC KIDNEY DISEASE, WITHOUT LONG-TERM CURRENT USE OF INSULIN (HCC): ICD-10-CM

## 2019-12-04 DIAGNOSIS — N18.31 CHRONIC KIDNEY DISEASE (CKD) STAGE G3A/A1, MODERATELY DECREASED GLOMERULAR FILTRATION RATE (GFR) BETWEEN 45-59 ML/MIN/1.73 SQUARE METER AND ALBUMINURIA CREATININE RATIO LESS THAN 30 MG/G (HCC): ICD-10-CM

## 2019-12-04 DIAGNOSIS — E78.2 MIXED HYPERLIPIDEMIA: ICD-10-CM

## 2019-12-04 PROCEDURE — 4040F PNEUMOC VAC/ADMIN/RCVD: CPT | Performed by: INTERNAL MEDICINE

## 2019-12-04 PROCEDURE — 1036F TOBACCO NON-USER: CPT | Performed by: INTERNAL MEDICINE

## 2019-12-04 PROCEDURE — G8417 CALC BMI ABV UP PARAM F/U: HCPCS | Performed by: INTERNAL MEDICINE

## 2019-12-04 PROCEDURE — 1090F PRES/ABSN URINE INCON ASSESS: CPT | Performed by: INTERNAL MEDICINE

## 2019-12-04 PROCEDURE — 99214 OFFICE O/P EST MOD 30 MIN: CPT | Performed by: INTERNAL MEDICINE

## 2019-12-04 PROCEDURE — 1123F ACP DISCUSS/DSCN MKR DOCD: CPT | Performed by: INTERNAL MEDICINE

## 2019-12-04 PROCEDURE — G8482 FLU IMMUNIZE ORDER/ADMIN: HCPCS | Performed by: INTERNAL MEDICINE

## 2019-12-04 PROCEDURE — G8399 PT W/DXA RESULTS DOCUMENT: HCPCS | Performed by: INTERNAL MEDICINE

## 2019-12-04 PROCEDURE — 93000 ELECTROCARDIOGRAM COMPLETE: CPT | Performed by: INTERNAL MEDICINE

## 2019-12-04 PROCEDURE — G8427 DOCREV CUR MEDS BY ELIG CLIN: HCPCS | Performed by: INTERNAL MEDICINE

## 2020-01-02 ENCOUNTER — OFFICE VISIT (OUTPATIENT)
Dept: INTERNAL MEDICINE CLINIC | Age: 85
End: 2020-01-02
Payer: MEDICARE

## 2020-01-02 VITALS
HEIGHT: 62 IN | OXYGEN SATURATION: 96 % | WEIGHT: 198 LBS | SYSTOLIC BLOOD PRESSURE: 125 MMHG | HEART RATE: 56 BPM | DIASTOLIC BLOOD PRESSURE: 70 MMHG | BODY MASS INDEX: 36.44 KG/M2

## 2020-01-02 PROBLEM — B35.9 TINEA: Status: ACTIVE | Noted: 2020-01-02

## 2020-01-02 PROBLEM — Z12.11 COLON CANCER SCREENING: Status: ACTIVE | Noted: 2020-01-02

## 2020-01-02 PROBLEM — R73.03 PREDIABETES: Status: ACTIVE | Noted: 2020-01-02

## 2020-01-02 PROBLEM — I25.10 CORONARY ARTERY DISEASE INVOLVING NATIVE HEART WITHOUT ANGINA PECTORIS: Status: ACTIVE | Noted: 2020-01-02

## 2020-01-02 LAB — TSH SERPL DL<=0.05 MIU/L-ACNC: 1.06 UIU/ML (ref 0.27–4.2)

## 2020-01-02 PROCEDURE — G8417 CALC BMI ABV UP PARAM F/U: HCPCS | Performed by: INTERNAL MEDICINE

## 2020-01-02 PROCEDURE — G8427 DOCREV CUR MEDS BY ELIG CLIN: HCPCS | Performed by: INTERNAL MEDICINE

## 2020-01-02 PROCEDURE — 1090F PRES/ABSN URINE INCON ASSESS: CPT | Performed by: INTERNAL MEDICINE

## 2020-01-02 PROCEDURE — 1123F ACP DISCUSS/DSCN MKR DOCD: CPT | Performed by: INTERNAL MEDICINE

## 2020-01-02 PROCEDURE — 36415 COLL VENOUS BLD VENIPUNCTURE: CPT | Performed by: INTERNAL MEDICINE

## 2020-01-02 PROCEDURE — 99214 OFFICE O/P EST MOD 30 MIN: CPT | Performed by: INTERNAL MEDICINE

## 2020-01-02 PROCEDURE — G8399 PT W/DXA RESULTS DOCUMENT: HCPCS | Performed by: INTERNAL MEDICINE

## 2020-01-02 PROCEDURE — G8482 FLU IMMUNIZE ORDER/ADMIN: HCPCS | Performed by: INTERNAL MEDICINE

## 2020-01-02 PROCEDURE — 1036F TOBACCO NON-USER: CPT | Performed by: INTERNAL MEDICINE

## 2020-01-02 PROCEDURE — 4040F PNEUMOC VAC/ADMIN/RCVD: CPT | Performed by: INTERNAL MEDICINE

## 2020-01-02 RX ORDER — CLOTRIMAZOLE AND BETAMETHASONE DIPROPIONATE 10; .64 MG/G; MG/G
CREAM TOPICAL
Qty: 45 G | Refills: 0 | Status: SHIPPED | OUTPATIENT
Start: 2020-01-02 | End: 2020-01-02

## 2020-01-02 RX ORDER — FAMOTIDINE 20 MG/1
20 TABLET, FILM COATED ORAL 2 TIMES DAILY
Qty: 60 TABLET | Refills: 3 | Status: SHIPPED | OUTPATIENT
Start: 2020-01-02 | End: 2020-01-02

## 2020-01-02 RX ORDER — FAMOTIDINE 20 MG/1
20 TABLET, FILM COATED ORAL 2 TIMES DAILY
Qty: 60 TABLET | Refills: 3 | Status: SHIPPED | OUTPATIENT
Start: 2020-01-02 | End: 2020-04-13 | Stop reason: SDUPTHER

## 2020-01-02 RX ORDER — BENZONATATE 100 MG/1
100-200 CAPSULE ORAL 3 TIMES DAILY PRN
Qty: 90 CAPSULE | Refills: 0 | Status: SHIPPED | OUTPATIENT
Start: 2020-01-02 | End: 2020-01-02

## 2020-01-02 RX ORDER — BENZONATATE 100 MG/1
100-200 CAPSULE ORAL 3 TIMES DAILY PRN
Qty: 90 CAPSULE | Refills: 0 | Status: SHIPPED | OUTPATIENT
Start: 2020-01-02 | End: 2020-02-01

## 2020-01-02 RX ORDER — CLOTRIMAZOLE AND BETAMETHASONE DIPROPIONATE 10; .64 MG/G; MG/G
CREAM TOPICAL
Qty: 45 G | Refills: 0 | Status: SHIPPED | OUTPATIENT
Start: 2020-01-02 | End: 2020-02-14 | Stop reason: SDUPTHER

## 2020-01-02 ASSESSMENT — PATIENT HEALTH QUESTIONNAIRE - PHQ9
SUM OF ALL RESPONSES TO PHQ QUESTIONS 1-9: 0
1. LITTLE INTEREST OR PLEASURE IN DOING THINGS: 0
2. FEELING DOWN, DEPRESSED OR HOPELESS: 0
SUM OF ALL RESPONSES TO PHQ QUESTIONS 1-9: 0
SUM OF ALL RESPONSES TO PHQ9 QUESTIONS 1 & 2: 0

## 2020-01-02 NOTE — PROGRESS NOTES
 REBECA (stress urinary incontinence, female)     Thyroid disease     Type 2 diabetes mellitus with stage 3 chronic kidney disease, without long-term current use of insulin ([address] 7/6/2017    Wears glasses     Wears hearing aid     Bilateral        Past Surgical History:  Past Surgical History:   Procedure Laterality Date    BLEPHAROPLASTY Bilateral 08/24/2009    CARDIAC CATHETERIZATION  2005    \"Normal\"    CARPAL TUNNEL RELEASE  09/12/2018    CHOLECYSTECTOMY  1980's    \"They Also  Removed An Ovary, Not Sure Which Side\"    COLONOSCOPY  04/2014    HX: polyps    COLONOSCOPY  08/21/2018    internal grade 1 hemorrhoids, severe diverticulosis found in sigmoid colon and descending colon, three 4mm and 2mm sessile polyps found in rectum, two 3mm polyps found in mid ascending colon, two 7mm sessile polyps in descending colon, single 2 mm polyp in splenic flexure, 4 mm polyp found in colon    DILATION AND CURETTAGE OF UTERUS      \"At Least 3 Prior To LATRICIA In 1972\"    EYE SURGERY Bilateral 2011    Cataracts With Lens Implants    EYE SURGERY Left 1999    \"Tear In The Retina\"    HYSTERECTOMY, TOTAL ABDOMINAL  1972    JOINT REPLACEMENT Right 09/26/2007    Total Right Knee    JOINT REPLACEMENT Left 11/06/2007    Total Left Knee    PERIPHERAL PERCUTANEOUS VENOUS INTERVENTION      Dr Steffanie Engle  In Past    Face, Right Shoulder       Allergies:   Allergies   Allergen Reactions    Codeine Rash    Lactose Intolerance (Gi) Nausea Only     \"Stomach Cramps\"    Lipitor [Atorvastatin]      \"Severe Muscle Pain\"    Penicillins      \"Polyarthritis, Every Joint Was Swollen\"    Phenobarbital Rash     \"Got Hyper\"    Sulfa Antibiotics Nausea And Vomiting    Tape Nilda Linker Tape]      \"Tears And Burns The Skin\"    Vancomycin Itching     \"Red Man Syndrome\"    Zocor [Simvastatin]      \"Muscle Pain\"       Medications:  Current outpatient prescriptions:  Outpatient Medications Marked as Taking for the 1/2/20 Cholecalciferol (VITAMIN D3) 2000 units CAPS Take by mouth nightly Over The Counter      rOPINIRole (REQUIP) 0.5 MG tablet Take 2 tablets by mouth nightly 180 tablet 1    aspirin 81 MG tablet Take 81 mg by mouth nightly Over The Counter         Social History:  Social History     Socioeconomic History    Marital status:      Spouse name: Not on file    Number of children: Not on file    Years of education: Not on file    Highest education level: Not on file   Occupational History    Not on file   Social Needs    Financial resource strain: Not on file    Food insecurity:     Worry: Not on file     Inability: Not on file    Transportation needs:     Medical: Not on file     Non-medical: Not on file   Tobacco Use    Smoking status: Never Smoker    Smokeless tobacco: Never Used   Substance and Sexual Activity    Alcohol use: Yes     Comment: \"Maybe 3 Times A Year\"    Drug use: No    Sexual activity: Never   Lifestyle    Physical activity:     Days per week: Not on file     Minutes per session: Not on file    Stress: Not on file   Relationships    Social connections:     Talks on phone: Not on file     Gets together: Not on file     Attends Spiritism service: Not on file     Active member of club or organization: Not on file     Attends meetings of clubs or organizations: Not on file     Relationship status: Not on file    Intimate partner violence:     Fear of current or ex partner: Not on file     Emotionally abused: Not on file     Physically abused: Not on file     Forced sexual activity: Not on file   Other Topics Concern    Not on file   Social History Narrative    Not on file       Family History:  Family History   Problem Relation Age of Onset    Heart Disease Mother     Breast Cancer Mother     Heart Disease Father     Diabetes Father     High Blood Pressure Father     Cancer Father     Stroke Father          Review of Systems:  Constitutional: no fevers, no chills, no night sweats, no weight loss, no weight gain, no fatigue   Pain assessment: no pain  Head: no headaches  Ears: no hearing loss, no tinnitus, no vertigo  Eyes: no blurry vision, no diplopia, no dryness, no itchiness  Mouth: no oral ulcers, no dry mouth, no sore throat  Nose: no nasal congestion, no epistaxis  Cardiac: no chest pain, no palpitations, no leg swelling, no orthopnea, no PND, no syncope  Pulmonary: no dyspnea, no cough, no wheezing, no hemoptysis  GI: no nausea, no vomiting, no diarrhea, no constipation, no abdominal pain, no hematochezia  : no dysuria, no frequency, no urgency, no hematuria, no frothy urine, no dyspareunia, no pelvic pain, no vaginal bleeding, no abnormal vaginal discharge  MSK: no arthralgias, no myalgias, no early morning stiffness, no Raynaud's   Neuro: no focal neurological deficits, no seizures  Sleep: no snoring, no daytime somnolence   Psych: no depression, no anxiety, no suicidal ideation      Physical Exam:  VITALS:   /70   Pulse 56   Ht 5' 2\" (1.575 m)   Wt 198 lb (89.8 kg)   SpO2 96%   BMI 36.21 kg/m²     PHYSICAL EXAMINATION:  General: alert, awake, and oriented to time, place, person, and situation. Not in acute distress   Skin:  L sided chest patch   Head: normocephalic/atraumatic  Eyes: anicteric sclera, well-injected conjunctiva. Pupils are equally round and reactive to light.  Extraocular movements are intact   Nose/Sinuses: no sinus tenderness, septum midline, mucosa appears normal   Oropharynx: lips, teeth, and tongue normal. Non-erythematous pharynx, no oral ulcers, moist mucous membranes, no tonsillar exudates   Ears: external ears normal, canals clear, good light reflex in TM bilaterally  Neck: supple, no cervical lymphadenopathy, thyroid symmetric and not enlarged, no bruits   Heart: regular rate and rhythm, regular S1/S2, no S3/S4, no audible murmurs, no audible friction rub  Lungs: clear to auscultation bilaterally, no audible crackles, no audible wheezes, legs syndrome)  Continue Ropinirole 0.5-1mg QHS PRN    11. Tinea  Clotrimazole-Betamethasone topical    12. Colon cancer screening  Due for colonoscopy as she had > 5 polyps in Aug 2018   - [place], CNP, Gastroenterology, [facility] discussed with patient and questions answered. Patient verbalizes understanding and agrees with plan. Discussed with patient the importance of continuity of care. I encouraged patient to schedule next appointment within 3 months with me. Patient prefers to be reached by Phone call at [phone] for future medical correspondence. Encouraged to activate MyChart. I reviewed and reconciled the medications this visit. I reviewed and updated the past medical, surgical, social, and family history during this visit. After visit summary provided.        Alexandr Carr MD  Internal Medicine  1/2/2020   12:38 PM

## 2020-01-02 NOTE — PATIENT INSTRUCTIONS
dose, do NOT take a double dose of medicine. Take your usual dose the next day. · Tell your doctor about all prescription, herbal, or over-the-counter products you take. · Take care of yourself. Eat a healthy diet, get enough sleep, and get regular exercise. When should you call for help? Call 911 anytime you think you may need emergency care. For example, call if:    · You passed out (lost consciousness).     · You have severe trouble breathing.     · You have a very slow heartbeat (less than 60 beats a minute).     · You have a low body temperature (95°F or below).    Call your doctor now or seek immediate medical care if:    · You feel tired, sluggish, or weak.     · You have trouble remembering things or concentrating.     · You do not begin to feel better 2 weeks after starting your medicine.    Watch closely for changes in your health, and be sure to contact your doctor if you have any problems. Where can you learn more? Go to [email or web address] org and sign in to your [employer] account. Enter S226 in the [employer] box to learn more about \"Hypothyroidism: Care Instructions. \"     If you do not have an account, please click on the \"Sign Up Now\" link. Current as of: November 6, 2018  Content Version: 12.1  © 5883-8369 Healthwise, Incorporated. Care instructions adapted under license by [facility] [place] ([facility]). If you have questions about a medical condition or this instruction, always ask your healthcare professional. [place] [address] any warranty or liability for your use of this information.

## 2020-01-03 LAB
ESTIMATED AVERAGE GLUCOSE: 111.2 MG/DL
HBA1C MFR BLD: 5.5 %

## 2020-01-07 ENCOUNTER — TELEPHONE (OUTPATIENT)
Dept: INTERNAL MEDICINE CLINIC | Age: 85
End: 2020-01-07

## 2020-01-14 RX ORDER — POTASSIUM CHLORIDE 750 MG/1
10 TABLET, EXTENDED RELEASE ORAL DAILY
Qty: 90 TABLET | Refills: 1 | Status: SHIPPED | OUTPATIENT
Start: 2020-01-14

## 2020-01-17 ENCOUNTER — PREP FOR PROCEDURE (OUTPATIENT)
Dept: GASTROENTEROLOGY | Age: 85
End: 2020-01-17

## 2020-01-17 ENCOUNTER — OFFICE VISIT (OUTPATIENT)
Dept: GASTROENTEROLOGY | Age: 85
End: 2020-01-17
Payer: MEDICARE

## 2020-01-17 VITALS
WEIGHT: 200 LBS | SYSTOLIC BLOOD PRESSURE: 116 MMHG | HEIGHT: 62 IN | OXYGEN SATURATION: 96 % | DIASTOLIC BLOOD PRESSURE: 78 MMHG | BODY MASS INDEX: 36.8 KG/M2 | RESPIRATION RATE: 15 BRPM | HEART RATE: 59 BPM

## 2020-01-17 PROCEDURE — 1036F TOBACCO NON-USER: CPT | Performed by: NURSE PRACTITIONER

## 2020-01-17 PROCEDURE — G8427 DOCREV CUR MEDS BY ELIG CLIN: HCPCS | Performed by: NURSE PRACTITIONER

## 2020-01-17 PROCEDURE — G8399 PT W/DXA RESULTS DOCUMENT: HCPCS | Performed by: NURSE PRACTITIONER

## 2020-01-17 PROCEDURE — 1123F ACP DISCUSS/DSCN MKR DOCD: CPT | Performed by: NURSE PRACTITIONER

## 2020-01-17 PROCEDURE — G8417 CALC BMI ABV UP PARAM F/U: HCPCS | Performed by: NURSE PRACTITIONER

## 2020-01-17 PROCEDURE — G8482 FLU IMMUNIZE ORDER/ADMIN: HCPCS | Performed by: NURSE PRACTITIONER

## 2020-01-17 PROCEDURE — 4040F PNEUMOC VAC/ADMIN/RCVD: CPT | Performed by: NURSE PRACTITIONER

## 2020-01-17 PROCEDURE — 1090F PRES/ABSN URINE INCON ASSESS: CPT | Performed by: NURSE PRACTITIONER

## 2020-01-17 PROCEDURE — 99215 OFFICE O/P EST HI 40 MIN: CPT | Performed by: NURSE PRACTITIONER

## 2020-01-17 RX ORDER — SODIUM CHLORIDE 0.9 % (FLUSH) 0.9 %
10 SYRINGE (ML) INJECTION PRN
Status: CANCELLED | OUTPATIENT
Start: 2020-01-17

## 2020-01-17 RX ORDER — SODIUM CHLORIDE, SODIUM LACTATE, POTASSIUM CHLORIDE, CALCIUM CHLORIDE 600; 310; 30; 20 MG/100ML; MG/100ML; MG/100ML; MG/100ML
INJECTION, SOLUTION INTRAVENOUS CONTINUOUS
Status: CANCELLED | OUTPATIENT
Start: 2020-01-17

## 2020-01-17 RX ORDER — SODIUM CHLORIDE 0.9 % (FLUSH) 0.9 %
10 SYRINGE (ML) INJECTION EVERY 12 HOURS SCHEDULED
Status: CANCELLED | OUTPATIENT
Start: 2020-01-17

## 2020-01-17 RX ORDER — POLYETHYLENE GLYCOL 3350 17 G/17G
238 POWDER, FOR SOLUTION ORAL ONCE
Qty: 1 BOTTLE | Refills: 0 | Status: SHIPPED | OUTPATIENT
Start: 2020-01-17 | End: 2020-01-17

## 2020-01-17 ASSESSMENT — ENCOUNTER SYMPTOMS
VOMITING: 0
COLOR CHANGE: 0
COUGH: 1
WHEEZING: 0
BLOOD IN STOOL: 0
EYE PAIN: 0
CONSTIPATION: 0
NAUSEA: 0
BACK PAIN: 0
SHORTNESS OF BREATH: 0
DIARRHEA: 0
ABDOMINAL PAIN: 0

## 2020-01-17 NOTE — PROGRESS NOTES
Gloria Chin 81 y.o. female was seen by ALYCIA Reilly on 01/17/20     Wt Readings from Last 3 Encounters:   01/17/20 200 lb (90.7 kg)   01/02/20 198 lb (89.8 kg)   12/04/19 198 lb (89.8 kg)       VARGHESE  Isela Talavera is a pleasant 81 y.o.  female who presents today for history of colon polyps and to schedule a colonoscopy. She has a past medical history of abnormal ECG, acid reflux, arthritis, CKD (chronic kidney disease), stage III, claustrophobia, diabetes mellitus, essential hypertension, heart palpitations, history of nuclear stress test, [place] (hard of hearing), holter monitor, abnormal, hx of doppler echocardiogram, hyperlipidemia, lactose intolerance, motion sickness, PONV (postoperative nausea and vomiting), PSVT (paroxysmal supraventricular tachycardia), restless leg syndrome, shortness of breath on exertion, REBECA (stress urinary incontinence, female), thyroid disease, type 2 diabetes mellitus with stage 3 chronic kidney disease, without long-term current use of insulin, wears glasses, and wears hearing aid. Her last colonoscopy was done by Dr. Anna Padron on 8- revealing severe diverticulosis in sigmoid colon, internal grade 1 hemorrhoids and multiple colon polyps that were removed. She denies changes in her bowel pattern. Her typical bowel pattern is daily with soft brown formed stools. No diarrhea or constipation. No blood in her stools or black tarry stools. No excess belching or flatulence. Her appetite is good without early satiety. Her weight is stable. No nausea or vomiting. No abdominal pain, bloating or distention. Her heartburn and acid reflux is controlled with Pepcid. She was taken off Protonix two years ago. No nocturnal awakenings with acid reflux. No dysphagia or pain with swallowing. Her daughter had colon cancer at age 62. ROS  Review of Systems   Constitutional: Negative for chills and fever.    HENT: Positive for hearing loss (bilateral hearing aids). Negative for ear pain and tinnitus. Eyes: Positive for visual disturbance. Negative for pain. Respiratory: Positive for cough (intermittent). Negative for shortness of breath and wheezing. Cardiovascular: Positive for leg swelling. Negative for chest pain and palpitations. Gastrointestinal: Negative for abdominal pain, blood in stool, constipation, diarrhea, nausea and vomiting. Endocrine: Negative for cold intolerance and heat intolerance. Genitourinary: Negative for dysuria, frequency and urgency. Musculoskeletal: Positive for myalgias. Negative for back pain and neck pain. Skin: Negative for color change, pallor and rash. Allergic/Immunologic: Negative for environmental allergies and food allergies. Neurological: Negative for dizziness, seizures and headaches. Hematological: Does not bruise/bleed easily. Psychiatric/Behavioral: Negative for dysphoric mood and suicidal ideas. The patient is not nervous/anxious.         Allergies  Allergies   Allergen Reactions    Codeine Rash    Lactose Intolerance (Gi) Nausea Only     \"Stomach Cramps\"    Lipitor [Atorvastatin]      \"Severe Muscle Pain\"    Penicillins      \"Polyarthritis, Every Joint Was Swollen\"    Phenobarbital Rash     \"Got Hyper\"    Sulfa Antibiotics Nausea And Vomiting    Tape [Adhesive Tape]      \"Tears And Burns The Skin\"    Vancomycin Itching     \"Red Man Syndrome\"    Zocor [Simvastatin]      \"Muscle Pain\"       Medications  Current Outpatient Medications   Medication Sig Dispense Refill    polyethylene glycol (MIRALAX) powder Take 238 g by mouth once for 1 dose please provide for colonoscopy prep 1 Bottle 0    bisacodyl (BISACODYL) 5 MG EC tablet Take 4 tablets once for colonoscopy prep 4 tablet 0    metoprolol tartrate (LOPRESSOR) 25 MG tablet TAKE 1 TABLET TWICE DAILY 180 tablet 1    magnesium oxide (MAG-OX) 400 (241.3 Mg) MG TABS tablet TAKE 1 TABLET EVERY DAY 90 tablet 1    potassium chloride (KLOR-CON M) 10 MEQ extended release tablet Take 1 tablet by mouth daily 90 tablet 1    clotrimazole-betamethasone (LOTRISONE) 1-0.05 % cream Apply topically 2 times daily.  45 g 0    benzonatate (TESSALON) 100 MG capsule Take 1-2 capsules by mouth 3 times daily as needed for Cough 90 capsule 0    famotidine (PEPCID) 20 MG tablet Take 1 tablet by mouth 2 times daily 60 tablet 3    montelukast (SINGULAIR) 10 MG tablet Take 10 mg by mouth every morning      losartan (COZAAR) 50 MG tablet Take 1 tablet by mouth daily 90 tablet 5    levothyroxine (SYNTHROID) 137 MCG tablet Take 1 tablet by mouth daily 90 tablet 1    fluvastatin (LESCOL XL) 80 MG extended release tablet TAKE 1 TABLET EVERY DAY 90 tablet 1    ezetimibe (ZETIA) 10 MG tablet Take 1 tablet by mouth daily 30 tablet 3    albuterol sulfate  (90 Base) MCG/ACT inhaler Inhale 2 puffs into the lungs every 6 hours as needed for Wheezing or Shortness of Breath 1 Inhaler 11    amLODIPine (NORVASC) 5 MG tablet TAKE 1 TABLET EVERY NIGHT AND IN THE MORNING IF SYSTOLIC BLOOD PRESSURE IS GREATER THAN 150 180 tablet 1    Multiple Vitamins-Minerals (MULTIVITAMIN PO) Take by mouth nightly Over The Counter      Calcium Carb-Cholecalciferol (CALCIUM + D3 PO) Take by mouth 2 times daily Over The Counter      Omega-3 Fatty Acids (FISH OIL PO) Take 1,000 mg by mouth nightly Over The Counter       Docusate Calcium (STOOL SOFTENER PO) Take by mouth nightly Over The Counter      Cholecalciferol (VITAMIN D3) 2000 units CAPS Take by mouth nightly Over The Counter      rOPINIRole (REQUIP) 0.5 MG tablet Take 2 tablets by mouth nightly 180 tablet 1    aspirin 81 MG tablet Take 81 mg by mouth nightly Over The Counter      blood glucose monitor strips To check blood sugar twice daily with current Glucometer:   DX: diabetes type .00 100 strip 11    Lancets MISC To test twice daily  Dx: E 11.9 60 each 11     No current facility-administered medications for this be adenomatous recommend colonoscopy for colorectal cancer surveillance. The patient was provided with information on colon polyps. 3.  Diverticulosis of large intestine without bleeding. The patient was encouraged to eat a high fiber and resume good bowel regimen. 4.  Further recommendations for follow-up will be determined after the colonoscopy has been completed.

## 2020-02-01 PROBLEM — Z12.11 COLON CANCER SCREENING: Status: RESOLVED | Noted: 2020-01-02 | Resolved: 2020-02-01

## 2020-02-06 NOTE — PROGRESS NOTES
Surgery  2/20/20 @ 0800, arrival 0630                   1.  Follow Dr. Oumar Mckee instructions for the prep. 2. Follow your directions as prescribed by the doctor for your procedure and medications. 3. Check with your Doctor regarding stopping Plavix, Coumadin, Lovenox,Effient,Pradaxa,Xarelto, Fragmin or other blood thinners and                   follow their instructions. 4. Do not smoke, and do not drink any alcoholic beverages 24 hours prior to surgery. This includes NA Beer. 5. You may brush your teeth and gargle the morning of surgery. DO NOT SWALLOW WATER   6. You MUST make arrangements for a responsible adult to take you home after your surgery and be able to check on you every couple                   hours for the day. You will not be allowed to leave alone or drive yourself home. It is strongly suggested someone stay with you the first 24                   hrs. Your surgery will be cancelled if you do not have a ride home. 7. Please wear simple, loose fitting clothing to the hospital.  Maty Benoit not bring valuables (money, credit cards, checkbooks, etc.) Do not wear any                   makeup (including no eye makeup) or nail polish on your fingers or toes. 8. DO NOT wear any jewelry or piercings on day of surgery. All body piercing jewelry must be removed. 9. If you have dentures, they will be removed before going to the OR; we will provide you a container. If you wear contact lenses or glasses,                  they will be removed; please bring a case for them. 10. If you  have a Living Will and Durable Power of  for Healthcare, please bring in a copy. 11. Please bring picture ID,  insurance card, paperwork from the doctors office    (H & P, Consent, & card for implantable devices). 12. Take a shower the night before or morning of your procedure, do not apply any lotion, oil or powder.

## 2020-02-07 ENCOUNTER — ANESTHESIA EVENT (OUTPATIENT)
Dept: ENDOSCOPY | Age: 85
End: 2020-02-07
Payer: MEDICARE

## 2020-02-07 NOTE — ANESTHESIA PRE PROCEDURE
J18.1    Adenomatous polyp of colon D12.6    Need for prophylactic vaccination and inoculation against varicella Z23    Needs flu shot Z23    Right foot pain M79.671    Varicose veins of both legs with edema I83.893    Mild intermittent asthma J45.20    Osteopenia of multiple sites M85.89    Hypokalemia E87.6    Muscle cramps at night R25.2    Coronary artery disease involving native heart without angina pectoris I25.10    Prediabetes R73.03    Tinea B35.9       Past Medical History:        Diagnosis Date    #[id number] In Past     Skin Cancer Excised Face, Right Shoulder In Past    Abnormal ECG 10/5/2017    Acid reflux     Arthritis     \"Hands\"    CKD (chronic kidney disease), stage III (HCC)     Sees Dr. Medina Vicente Diabetes mellitus ([facility] [address] Dx 1990's    \"Pre Diabetic\"    Essential hypertension 3/5/2017    Heart palpitations     Sees Dr. Vandana Arenas History of nuclear stress test 10/12/2017    cardiolite-EF69%,small lateral wall ischemia    [place] (hard of hearing)     Bilateral Hearing Aids    Holter monitor, abnormal 10/05/2017    Hx of Doppler echocardiogram 10/11/2017    EF50-60%,increased LVOT and AOV    Hx of echocardiogram 10/06/2016    EF54% Mild to mod MR, See media    Hyperlipidemia 3/5/2017    Lactose intolerance     Motion sickness     PONV (postoperative nausea and vomiting)     PSVT (paroxysmal supraventricular tachycardia) ([facility] [address] 11/28/2018    10/2017 monitor 150 bpm PSVT    Restless leg syndrome     Shortness of breath on exertion     REBECA (stress urinary incontinence, female)     Thyroid disease     Type 2 diabetes mellitus with stage 3 chronic kidney disease, without long-term current use of insulin ([address] 7/6/2017    Wears glasses     Wears hearing aid     Bilateral        Past Surgical History:        Procedure Laterality Date    APPENDECTOMY  1970's    BLEPHAROPLASTY Bilateral 08/24/2009    CARDIAC CATHETERIZATION  2005    \"Normal\"    CARPAL TUNNEL RELEASE Right 09/12/2018    CHOLECYSTECTOMY  1980's    \"They Also  Removed An Ovary, Not Sure Which Side\"    COLONOSCOPY  04/2014    HX: polyps    COLONOSCOPY  08/21/2018    internal grade 1 hemorrhoids, severe diverticulosis found in sigmoid colon and descending colon, three 4mm and 2mm sessile polyps found in rectum, two 3mm polyps found in mid ascending colon, two 7mm sessile polyps in descending colon, single 2 mm polyp in splenic flexure, 4 mm polyp found in colon    DILATION AND CURETTAGE OF UTERUS      \"At Least 3 Prior To LATRICIA In 1972\"    EYE SURGERY Bilateral 2011    Cataracts With Lens Implants    EYE SURGERY Left 1999    \"Tear In The Retina\"    HYSTERECTOMY, TOTAL ABDOMINAL  1972    JOINT REPLACEMENT Right 09/26/2007    Total Right Knee    JOINT REPLACEMENT Left 11/06/2007    Total Left Knee    PERIPHERAL PERCUTANEOUS VENOUS INTERVENTION  2015    Dr Mercedes Esparza  In Past    Face, Right Shoulder       Social History:    Social History     Tobacco Use    Smoking status: Never Smoker    Smokeless tobacco: Never Used   Substance Use Topics    Alcohol use: Yes     Comment: \"Maybe 3 Times A Year\"                                Counseling given: Not Answered      Vital Signs (Current):   Vitals:    02/06/20 1233   Weight: 200 lb (90.7 kg)   Height: 5' 2\" (1.575 m)                                              BP Readings from Last 3 Encounters:   01/17/20 116/78   01/02/20 125/70   12/04/19 124/70       NPO Status:                                          24 hrs. Solids                 6  hrs. clears                                       BMI:   Wt Readings from Last 3 Encounters:   01/17/20 200 lb (90.7 kg)   01/02/20 198 lb (89.8 kg)   12/04/19 198 lb (89.8 kg)     Body mass index is 36.58 kg/m².     CBC:   Lab Results   Component Value Date    WBC 6.9 06/13/2019    RBC 4.99 06/13/2019    HGB 14.2 06/13/2019    HCT 44.2 06/13/2019    MCV 88.6 06/13/2019    RDW 12.5 06/13/2019  06/13/2019       CMP:   Lab Results   Component Value Date     11/26/2019    K 4.4 11/26/2019     11/26/2019    CO2 23 11/26/2019    BUN 18 11/26/2019    CREATININE 1.1 11/26/2019    GFRAA 57 11/26/2019    LABGLOM 47 11/26/2019    GLUCOSE 109 11/26/2019    PROT 7.3 03/26/2019    CALCIUM 9.8 11/26/2019    BILITOT 0.8 03/26/2019    ALKPHOS 77 03/26/2019    AST 21 03/26/2019    ALT 13 03/26/2019       POC Tests: No results for input(s): POCGLU, POCNA, POCK, POCCL, POCBUN, POCHEMO, POCHCT in the last 72 hours. Coags: No results found for: PROTIME, INR, APTT    HCG (If Applicable): No results found for: PREGTESTUR, PREGSERUM, HCG, HCGQUANT     ABGs: No results found for: PHART, PO2ART, VGJ3BDR, ZFL4UBG, BEART, V4BATQFC     Type & Screen (If Applicable):  No results found for: LABABO, [address]    Anesthesia Evaluation  Patient summary reviewed and Nursing notes reviewed   history of anesthetic complications: PONV. Airway: Mallampati: II  TM distance: >3 FB   Neck ROM: full  Mouth opening: > = 3 FB Dental: normal exam         Pulmonary:normal exam    (+) pneumonia:  asthma:                            Cardiovascular:  Exercise tolerance: good (>4 METS),   (+) hypertension:, CAD:, dysrhythmias: SVT, PINEDA:,       ECG reviewed      Echocardiogram reviewed         Beta Blocker:  Dose within 24 Hrs      ROS comment:  Left ventricular systolic function is normal with an ejection fraction of   50-60%. Mild concentric left ventricular hypertrophy with Grade I diastolic   dysfunction. Increased LVOT and AoV velocities with increased mean gradient of 13 mmHg. Aortic valve is opening normally without signs of sclerosis. No evidence of pericardial effusion.      Neuro/Psych:   Negative Neuro/Psych ROS              GI/Hepatic/Renal:   (+) GERD:, renal disease: CRI, bowel prep,           Endo/Other:    (+) DiabetesType II DM, , hypothyroidism::., .          Pt had no PAT visit       Abdominal:   (+) obese,

## 2020-02-10 ENCOUNTER — HOSPITAL ENCOUNTER (OUTPATIENT)
Age: 85
Setting detail: OUTPATIENT SURGERY
Discharge: HOME | End: 2020-02-10
Attending: SURGERY | Admitting: SURGERY
Payer: MEDICARE

## 2020-02-10 ENCOUNTER — ANESTHESIA (OUTPATIENT)
Dept: ENDOSCOPY | Age: 85
End: 2020-02-10
Payer: MEDICARE

## 2020-02-10 VITALS
DIASTOLIC BLOOD PRESSURE: 79 MMHG | SYSTOLIC BLOOD PRESSURE: 180 MMHG | TEMPERATURE: 97.6 F | BODY MASS INDEX: 36.8 KG/M2 | WEIGHT: 200 LBS | RESPIRATION RATE: 16 BRPM | OXYGEN SATURATION: 99 % | HEIGHT: 62 IN | HEART RATE: 60 BPM

## 2020-02-10 VITALS — SYSTOLIC BLOOD PRESSURE: 125 MMHG | OXYGEN SATURATION: 98 % | DIASTOLIC BLOOD PRESSURE: 67 MMHG

## 2020-02-10 PROCEDURE — G0121 COLON CA SCRN NOT HI RSK IND: HCPCS | Performed by: SURGERY

## 2020-02-10 PROCEDURE — 6360000002 HC RX W HCPCS: Performed by: NURSE ANESTHETIST, CERTIFIED REGISTERED

## 2020-02-10 PROCEDURE — 7100000010 HC PHASE II RECOVERY - FIRST 15 MIN: Performed by: SURGERY

## 2020-02-10 PROCEDURE — 3609027000 HC COLONOSCOPY: Performed by: SURGERY

## 2020-02-10 PROCEDURE — 7100000011 HC PHASE II RECOVERY - ADDTL 15 MIN: Performed by: SURGERY

## 2020-02-10 PROCEDURE — 3700000001 HC ADD 15 MINUTES (ANESTHESIA): Performed by: SURGERY

## 2020-02-10 PROCEDURE — 2709999900 HC NON-CHARGEABLE SUPPLY: Performed by: SURGERY

## 2020-02-10 PROCEDURE — 2500000003 HC RX 250 WO HCPCS: Performed by: NURSE ANESTHETIST, CERTIFIED REGISTERED

## 2020-02-10 PROCEDURE — 3700000000 HC ANESTHESIA ATTENDED CARE: Performed by: SURGERY

## 2020-02-10 PROCEDURE — 2580000003 HC RX 258: Performed by: NURSE PRACTITIONER

## 2020-02-10 RX ORDER — PROPOFOL 10 MG/ML
INJECTION, EMULSION INTRAVENOUS PRN
Status: DISCONTINUED | OUTPATIENT
Start: 2020-02-10 | End: 2020-02-10 | Stop reason: SDUPTHER

## 2020-02-10 RX ORDER — SODIUM CHLORIDE, SODIUM LACTATE, POTASSIUM CHLORIDE, CALCIUM CHLORIDE 600; 310; 30; 20 MG/100ML; MG/100ML; MG/100ML; MG/100ML
INJECTION, SOLUTION INTRAVENOUS CONTINUOUS
Status: DISCONTINUED | OUTPATIENT
Start: 2020-02-10 | End: 2020-02-10 | Stop reason: HOSPADM

## 2020-02-10 RX ORDER — SODIUM CHLORIDE, SODIUM LACTATE, POTASSIUM CHLORIDE, CALCIUM CHLORIDE 600; 310; 30; 20 MG/100ML; MG/100ML; MG/100ML; MG/100ML
INJECTION, SOLUTION INTRAVENOUS ONCE
Status: DISCONTINUED | OUTPATIENT
Start: 2020-02-10 | End: 2020-02-10

## 2020-02-10 RX ORDER — LIDOCAINE HYDROCHLORIDE 20 MG/ML
INJECTION, SOLUTION INFILTRATION; PERINEURAL PRN
Status: DISCONTINUED | OUTPATIENT
Start: 2020-02-10 | End: 2020-02-10 | Stop reason: SDUPTHER

## 2020-02-10 RX ADMIN — LIDOCAINE HYDROCHLORIDE 100 MG: 20 INJECTION, SOLUTION INFILTRATION; PERINEURAL at 09:23

## 2020-02-10 RX ADMIN — SODIUM CHLORIDE, POTASSIUM CHLORIDE, SODIUM LACTATE AND CALCIUM CHLORIDE: 600; 310; 30; 20 INJECTION, SOLUTION INTRAVENOUS at 07:44

## 2020-02-10 RX ADMIN — PROPOFOL 450 MG: 10 INJECTION, EMULSION INTRAVENOUS at 09:23

## 2020-02-10 ASSESSMENT — PAIN - FUNCTIONAL ASSESSMENT: PAIN_FUNCTIONAL_ASSESSMENT: 0-10

## 2020-02-10 ASSESSMENT — PAIN SCALES - GENERAL
PAINLEVEL_OUTOF10: 0

## 2020-02-10 NOTE — PROGRESS NOTES
Patient returned to room from endoscopy, report received from Saad Goddard, [facility]. A+Ox4,  VSS (see doc flow), assessment completed as per doc flow. Patient has no c/o pain. Beverage offered. Call light in reach, bed in low position. RN to continue to monitor. Family at bedside.

## 2020-02-10 NOTE — H&P
colonic mucosa with prominent lymphoid aggregate  and mild hyperplastic changes.      Negative for dysplasia and malignancy. Dyan Porch, splenic flexure, polyp:      Fragments of hyperplastic polyp. C.  Descending colon, polyps:      Fragments of tubular adenoma and fragments of  hyperplastic polyps. D.  Descending colon, mid, polyps:      Fragments of benign colonic mucosa with hyperplastic  changes and benign lymphoid      aggregate.  [place] for dysplasia and malignancy. E.  Rectum, polyps:      Fragments of tubular adenoma and hyperplastic polyps. Electronically Signed Out By Shanna Cates MD  Specimens Received:  A: 4mm transverse colon polyp  B: 2mm splenic flexior polyp  C: 7mm polyps x2 descending colon  D: 3mm polyps x2 descending colon  E: 4mm         PMH:   has a past medical history of #[id number] (In Past ), Abnormal ECG (10/5/2017), Acid reflux, Arthritis, CKD (chronic kidney disease), stage III ([address] Claustrophobia, Diabetes mellitus ([address] (Dx 1990's), Essential hypertension (3/5/2017), Heart palpitations, History of nuclear stress test (10/12/2017), [place] (hard of hearing), Holter monitor, abnormal (10/05/2017), Doppler echocardiogram (10/11/2017), echocardiogram (10/06/2016), Hyperlipidemia (3/5/2017), Lactose intolerance, Motion sickness, PONV (postoperative nausea and vomiting), PSVT (paroxysmal supraventricular tachycardia) ([address] (11/28/2018), Restless leg syndrome, Shortness of breath on exertion, REBECA (stress urinary incontinence, female), Thyroid disease, Type 2 diabetes mellitus with stage 3 chronic kidney disease, without long-term current use of insulin ([address] (7/6/2017), Wears glasses, and Wears hearing aid. PSH:   has a past surgical history that includes eye surgery (Bilateral, 2011); eye surgery (Left, 1999); Cardiac catheterization (2005); Skin cancer excision (In Past); Colonoscopy (04/2014);  Colonoscopy (08/21/2018); joint replacement (Right, 09/26/2007); joint replacement (Left, 11/06/2007); Hysterectomy, total abdominal (1972); Dilation and curettage of uterus; Blepharoplasty (Bilateral, 08/24/2009); Cholecystectomy (1980's); Carpal tunnel release (Right, 09/12/2018); PERIPHERAL PERCUTANEOUS VENOUS INTERVENTION (2015); and Appendectomy (1970's). Allergies: Allergies   Allergen Reactions    Codeine Rash    Lactose Intolerance (Gi) Nausea Only     \"Stomach Cramps\"    Lipitor [Atorvastatin]      \"Severe Muscle Pain\"    Penicillins      \"Polyarthritis, Every Joint Was Swollen\"    Phenobarbital Rash     \"Got Hyper\"    Sulfa Antibiotics Nausea And Vomiting    Tape [Adhesive Tape]      \"Tears And Burns The Skin\"    Vancomycin Itching     \"Red Man Syndrome\"    Zocor [Simvastatin]      \"Muscle Pain\"        Home Meds:    Prior to Admission medications    Medication Sig Start Date End Date Taking?  Authorizing Provider   bisacodyl (BISACODYL) 5 MG EC tablet Take 4 tablets once for colonoscopy prep 1/17/20  Yes HARDIK La - CNP   metoprolol tartrate (LOPRESSOR) 25 MG tablet TAKE 1 TABLET TWICE DAILY 1/15/20  Yes Nancy Negron MD   magnesium oxide (MAG-OX) 400 (241.3 Mg) MG TABS tablet TAKE 1 TABLET EVERY DAY 1/14/20  Yes Nancy Negron MD   potassium chloride (KLOR-CON M) 10 MEQ extended release tablet Take 1 tablet by mouth daily 1/14/20  Yes Nancy Negron MD   famotidine (PEPCID) 20 MG tablet Take 1 tablet by mouth 2 times daily 1/2/20  Yes Nancy Negron MD   losartan (COZAAR) 50 MG tablet Take 1 tablet by mouth daily 12/2/19  Yes Perfecto Solorzano MD   levothyroxine (SYNTHROID) 137 MCG tablet Take 1 tablet by mouth daily 8/20/19  Yes Hayley Warren MD   fluvastatin (LESCOL XL) 80 MG extended release tablet TAKE 1 TABLET EVERY DAY 8/20/19  Yes Hayley Warren MD   amLODIPine (NORVASC) 5 MG tablet TAKE 1 TABLET EVERY NIGHT AND IN THE MORNING IF SYSTOLIC BLOOD PRESSURE IS GREATER THAN 150 2/5/19  Yes Hayley Warren MD   Multiple Yes     Comment: \"Maybe 3 Times A Year\"    Drug use: No    Sexual activity: Never   Lifestyle    Physical activity:     Days per week: None     Minutes per session: None    Stress: None   Relationships    Social connections:     Talks on phone: None     Gets together: None     Attends Gnosticist service: None     Active member of club or organization: None     Attends meetings of clubs or organizations: None     Relationship status: None    Intimate partner violence:     Fear of current or ex partner: None     Emotionally abused: None     Physically abused: None     Forced sexual activity: None   Other Topics Concern    None   Social History Narrative    None      Family History   Problem Relation Age of Onset    Heart Disease Mother     Breast Cancer Mother     Heart Disease Father     Diabetes Father     High Blood Pressure Father     Cancer Father     Stroke Father     Colon Cancer Daughter     otherwise irrelevant to this surgical issue. Review of Systems:  Constitutional: Negative for chills and fever. HENT: Positive for hearing loss (bilateral hearing aids). Negative for ear pain and tinnitus. Eyes: Positive for visual disturbance. Negative for pain. Respiratory: Positive for cough (intermittent). Negative for shortness of breath and wheezing. Cardiovascular: Positive for leg swelling. Negative for chest pain and palpitations. Gastrointestinal: Negative for abdominal pain, blood in stool, constipation, diarrhea, nausea and vomiting. Endocrine: Negative for cold intolerance and heat intolerance. Genitourinary: Negative for dysuria, frequency and urgency. Musculoskeletal: Positive for myalgias. Negative for back pain and neck pain. Skin: Negative for color change, pallor and rash. Allergic/Immunologic: Negative for environmental allergies and food allergies. Neurological: Negative for dizziness, seizures and headaches. Hematological: Does not bruise/bleed easily. Psychiatric/Behavioral: Negative for dysphoric mood and suicidal ideas. The patient is not nervous/anxious.         Physical Exam:  Vital Signs:   Vitals:    02/10/20 0710   BP: (!) 160/80   Pulse: 60   Resp: 16   Temp: 96.8 °F (36 °C)   SpO2: 95%     Body mass index is 36.58 kg/m². General appearance: Pt Alert and oriented, to persons place and time, in no apparent acute distress. HEENT:  OBEY, EOMI, Conjunctivae clear. No JVDs, Bruits, No thyroid-megaly. Lungs: No dyspnea. Clear to auscultation bilaterally. Heart: Regular rate and rhythm, S1, S2 normal, no murmur, rub or gallop. No legs edema. Abdomen: Non tender. Non distended. Positive bowel sounds. No hernias noted, no masses palpable. Extremities: Warm, well perfused, no cyanosis or edema. Skin: Skin color, texture, turgor normal.  Neurologic: Grossly normal, Cranial nerves from II to XII intact. Deep tendon reflexes normal.  Psychology: Mood stable. Lymph nodes: No palpable LN in the neck, abdomen, or groins. Radiologic / Imaging / TESTING  No results found. Labs:    No results found for this or any previous visit (from the past 24 hour(s)).       Diagnosis:  Patient Active Problem List   Diagnosis    Gastroesophageal reflux disease    Acquired hypothyroidism    Essential hypertension    RLS (restless legs syndrome)    CKD (chronic [facility] disease), stage III ([facility]    Chronic kidney disease (CKD) stage G3a/A1, moderately decreased glomerular filtration rate (GFR) between 45-59 mL/min/1.73 square meter and albuminuria creatinine ratio less than 30 mg/g ([facility])    Mixed hyperlipidemia    Type 2 diabetes mellitus with stage 3 chronic kidney disease, without long-term current use of insulin ([facility])    Heart palpitations    Abnormal ECG    VHD (valvular heart disease)    Colon polyp    Carpal tunnel syndrome of right wrist    PSVT (paroxysmal supraventricular tachycardia) ([facility])    Pneumonia of left lower lobe due to infectious organism [facility])    Adenomatous polyp of colon    Need for prophylactic vaccination and inoculation against varicella    Needs flu shot    Right foot pain    Varicose veins of both legs with edema    Mild intermittent asthma    Osteopenia of multiple sites    Hypokalemia    Muscle cramps at night    Coronary artery disease involving native heart without angina pectoris    Prediabetes    Tinea           Assessment & Plan:        ICD-10-CM     1. History of colon polyps Z86.010 COLONOSCOPY (Diagnostic)   2. Diverticulosis large intestine w/o perforation or abscess w/o bleeding K57.30     3. Family history of colon cancer Z80.0              Plan  1. Will plan for a colonoscopy with MAC anesthesia. The patient was informed of the risks and benefits of the procedure. 2. History of colon polyps; two noted to be adenomatous recommend colonoscopy for colorectal cancer surveillance. The patient was provided with information on colon polyps. 3.  Diverticulosis of large intestine without bleeding. The patient was encouraged to eat a high fiber and resume good bowel regimen.   4.  Further recommendations for follow-up will be determined after the colonoscopy has been completed.         ____________________________________________    Denny Holcomb MD, FACS, FICS    2/10/2020  8:11 AM

## 2020-02-14 RX ORDER — CLOTRIMAZOLE AND BETAMETHASONE DIPROPIONATE 10; .64 MG/G; MG/G
CREAM TOPICAL
Qty: 45 G | Refills: 0 | Status: SHIPPED | OUTPATIENT
Start: 2020-02-14

## 2020-02-18 RX ORDER — LEVOTHYROXINE SODIUM 137 UG/1
137 TABLET ORAL DAILY
Qty: 90 TABLET | Refills: 1 | Status: SHIPPED | OUTPATIENT
Start: 2020-02-18

## 2020-02-18 RX ORDER — FLUVASTATIN SODIUM 80 MG/1
TABLET, FILM COATED, EXTENDED RELEASE ORAL
Qty: 90 TABLET | Refills: 1 | Status: SHIPPED | OUTPATIENT
Start: 2020-02-18

## 2020-04-13 RX ORDER — FAMOTIDINE 20 MG/1
20 TABLET, FILM COATED ORAL 2 TIMES DAILY
Qty: 60 TABLET | Refills: 3 | Status: SHIPPED | OUTPATIENT
Start: 2020-04-13

## 2020-10-09 ENCOUNTER — TELEPHONE (OUTPATIENT)
Dept: CARDIOLOGY CLINIC | Age: 85
End: 2020-10-09

## 2020-10-09 NOTE — TELEPHONE ENCOUNTER
Faxed medical records to Dr Mario Seay- also mailed them as requested to   [address] Iglesia Hernandez, [address] [place] [address]. [phone]

## 2021-11-22 ENCOUNTER — TELEPHONE (OUTPATIENT)
Dept: ADMINISTRATIVE | Facility: OTHER | Age: 86
End: 2021-11-22

## (undated) DEVICE — Z DISCONTINUED NO SUB IDED TUBING ETCO2 AD L6.5FT NSL ORAL CVD PRNG NONFLARED TIP OVR